# Patient Record
Sex: MALE | Race: WHITE | NOT HISPANIC OR LATINO | URBAN - METROPOLITAN AREA
[De-identification: names, ages, dates, MRNs, and addresses within clinical notes are randomized per-mention and may not be internally consistent; named-entity substitution may affect disease eponyms.]

---

## 2022-12-11 RX ORDER — CEFUROXIME AXETIL 250 MG
1 TABLET ORAL
Qty: 0 | Refills: 0 | DISCHARGE
Start: 2022-12-11 | End: 2022-12-15

## 2022-12-14 ENCOUNTER — INPATIENT (INPATIENT)
Facility: HOSPITAL | Age: 57
LOS: 2 days | Discharge: HOME | DRG: 872 | End: 2022-12-17
Attending: INTERNAL MEDICINE | Admitting: INTERNAL MEDICINE
Payer: COMMERCIAL

## 2022-12-14 VITALS
WEIGHT: 240.08 LBS | TEMPERATURE: 101 F | RESPIRATION RATE: 20 BRPM | HEIGHT: 73 IN | OXYGEN SATURATION: 98 % | HEART RATE: 96 BPM | DIASTOLIC BLOOD PRESSURE: 65 MMHG | SYSTOLIC BLOOD PRESSURE: 101 MMHG

## 2022-12-14 DIAGNOSIS — R50.9 FEVER, UNSPECIFIED: ICD-10-CM

## 2022-12-14 DIAGNOSIS — G47.33 OBSTRUCTIVE SLEEP APNEA (ADULT) (PEDIATRIC): ICD-10-CM

## 2022-12-14 DIAGNOSIS — J45.909 UNSPECIFIED ASTHMA, UNCOMPLICATED: ICD-10-CM

## 2022-12-14 DIAGNOSIS — I10 ESSENTIAL (PRIMARY) HYPERTENSION: ICD-10-CM

## 2022-12-14 DIAGNOSIS — C90.00 MULTIPLE MYELOMA NOT HAVING ACHIEVED REMISSION: ICD-10-CM

## 2022-12-14 DIAGNOSIS — Z29.9 ENCOUNTER FOR PROPHYLACTIC MEASURES, UNSPECIFIED: ICD-10-CM

## 2022-12-14 DIAGNOSIS — Z98.890 OTHER SPECIFIED POSTPROCEDURAL STATES: Chronic | ICD-10-CM

## 2022-12-14 LAB
ALBUMIN SERPL ELPH-MCNC: 3.5 G/DL — SIGNIFICANT CHANGE UP (ref 3.3–5)
ALP SERPL-CCNC: 51 U/L — SIGNIFICANT CHANGE UP (ref 40–120)
ALT FLD-CCNC: 21 U/L — SIGNIFICANT CHANGE UP (ref 10–45)
ANION GAP SERPL CALC-SCNC: 11 MMOL/L — SIGNIFICANT CHANGE UP (ref 5–17)
ANISOCYTOSIS BLD QL: SIGNIFICANT CHANGE UP
APPEARANCE UR: CLEAR — SIGNIFICANT CHANGE UP
APTT BLD: 35 SEC — SIGNIFICANT CHANGE UP (ref 27.5–35.5)
AST SERPL-CCNC: 70 U/L — HIGH (ref 10–40)
BACTERIA # UR AUTO: NEGATIVE — SIGNIFICANT CHANGE UP
BASE EXCESS BLDV CALC-SCNC: -7.9 MMOL/L — LOW (ref -2–3)
BASOPHILS # BLD AUTO: 0 K/UL — SIGNIFICANT CHANGE UP (ref 0–0.2)
BASOPHILS NFR BLD AUTO: 0 % — SIGNIFICANT CHANGE UP (ref 0–2)
BILIRUB SERPL-MCNC: 0.3 MG/DL — SIGNIFICANT CHANGE UP (ref 0.2–1.2)
BILIRUB UR-MCNC: NEGATIVE — SIGNIFICANT CHANGE UP
BUN SERPL-MCNC: 24 MG/DL — HIGH (ref 7–23)
CA-I SERPL-SCNC: 1.03 MMOL/L — LOW (ref 1.15–1.33)
CALCIUM SERPL-MCNC: 7.8 MG/DL — LOW (ref 8.4–10.5)
CHLORIDE BLDV-SCNC: 105 MMOL/L — SIGNIFICANT CHANGE UP (ref 96–108)
CHLORIDE SERPL-SCNC: 104 MMOL/L — SIGNIFICANT CHANGE UP (ref 96–108)
CO2 BLDV-SCNC: 19 MMOL/L — LOW (ref 22–26)
CO2 SERPL-SCNC: 17 MMOL/L — LOW (ref 22–31)
COLOR SPEC: YELLOW — SIGNIFICANT CHANGE UP
CREAT SERPL-MCNC: 1.29 MG/DL — SIGNIFICANT CHANGE UP (ref 0.5–1.3)
DACRYOCYTES BLD QL SMEAR: SLIGHT — SIGNIFICANT CHANGE UP
DIFF PNL FLD: NEGATIVE — SIGNIFICANT CHANGE UP
EGFR: 64 ML/MIN/1.73M2 — SIGNIFICANT CHANGE UP
ELLIPTOCYTES BLD QL SMEAR: SLIGHT — SIGNIFICANT CHANGE UP
EOSINOPHIL # BLD AUTO: 0.08 K/UL — SIGNIFICANT CHANGE UP (ref 0–0.5)
EOSINOPHIL NFR BLD AUTO: 1.7 % — SIGNIFICANT CHANGE UP (ref 0–6)
EPI CELLS # UR: 2 /HPF — SIGNIFICANT CHANGE UP
GAS PNL BLDV: 133 MMOL/L — LOW (ref 136–145)
GAS PNL BLDV: SIGNIFICANT CHANGE UP
GAS PNL BLDV: SIGNIFICANT CHANGE UP
GLUCOSE BLDV-MCNC: 80 MG/DL — SIGNIFICANT CHANGE UP (ref 70–99)
GLUCOSE SERPL-MCNC: 79 MG/DL — SIGNIFICANT CHANGE UP (ref 70–99)
GLUCOSE UR QL: NEGATIVE — SIGNIFICANT CHANGE UP
HCO3 BLDV-SCNC: 18 MMOL/L — LOW (ref 22–29)
HCT VFR BLD CALC: 25.5 % — LOW (ref 39–50)
HCT VFR BLDA CALC: 26 % — LOW (ref 39–51)
HGB BLD CALC-MCNC: 8.5 G/DL — LOW (ref 12.6–17.4)
HGB BLD-MCNC: 8.2 G/DL — LOW (ref 13–17)
HYALINE CASTS # UR AUTO: 6 /LPF — HIGH (ref 0–2)
INR BLD: 1.21 RATIO — HIGH (ref 0.88–1.16)
KETONES UR-MCNC: NEGATIVE — SIGNIFICANT CHANGE UP
LACTATE BLDV-MCNC: 1 MMOL/L — SIGNIFICANT CHANGE UP (ref 0.5–2)
LEUKOCYTE ESTERASE UR-ACNC: NEGATIVE — SIGNIFICANT CHANGE UP
LYMPHOCYTES # BLD AUTO: 0.6 K/UL — LOW (ref 1–3.3)
LYMPHOCYTES # BLD AUTO: 13 % — SIGNIFICANT CHANGE UP (ref 13–44)
MACROCYTES BLD QL: SIGNIFICANT CHANGE UP
MANUAL SMEAR VERIFICATION: SIGNIFICANT CHANGE UP
MCHC RBC-ENTMCNC: 32.2 GM/DL — SIGNIFICANT CHANGE UP (ref 32–36)
MCHC RBC-ENTMCNC: 35.2 PG — HIGH (ref 27–34)
MCV RBC AUTO: 109.4 FL — HIGH (ref 80–100)
METAMYELOCYTES # FLD: 4.4 % — HIGH (ref 0–0)
MONOCYTES # BLD AUTO: 0.2 K/UL — SIGNIFICANT CHANGE UP (ref 0–0.9)
MONOCYTES NFR BLD AUTO: 4.4 % — SIGNIFICANT CHANGE UP (ref 2–14)
NEUTROPHILS # BLD AUTO: 3.55 K/UL — SIGNIFICANT CHANGE UP (ref 1.8–7.4)
NEUTROPHILS NFR BLD AUTO: 60 % — SIGNIFICANT CHANGE UP (ref 43–77)
NEUTS BAND # BLD: 16.5 % — HIGH (ref 0–8)
NITRITE UR-MCNC: NEGATIVE — SIGNIFICANT CHANGE UP
NRBC # BLD: 2 /100 — HIGH (ref 0–0)
PCO2 BLDV: 35 MMHG — LOW (ref 42–55)
PH BLDV: 7.31 — LOW (ref 7.32–7.43)
PH UR: 6 — SIGNIFICANT CHANGE UP (ref 5–8)
PLAT MORPH BLD: NORMAL — SIGNIFICANT CHANGE UP
PLATELET # BLD AUTO: 67 K/UL — LOW (ref 150–400)
PO2 BLDV: 24 MMHG — LOW (ref 25–45)
POIKILOCYTOSIS BLD QL AUTO: SLIGHT — SIGNIFICANT CHANGE UP
POLYCHROMASIA BLD QL SMEAR: SLIGHT — SIGNIFICANT CHANGE UP
POTASSIUM BLDV-SCNC: 4.3 MMOL/L — SIGNIFICANT CHANGE UP (ref 3.5–5.1)
POTASSIUM SERPL-MCNC: 4.1 MMOL/L — SIGNIFICANT CHANGE UP (ref 3.5–5.3)
POTASSIUM SERPL-SCNC: 4.1 MMOL/L — SIGNIFICANT CHANGE UP (ref 3.5–5.3)
PROT SERPL-MCNC: 8.4 G/DL — HIGH (ref 6–8.3)
PROT UR-MCNC: ABNORMAL
PROTHROM AB SERPL-ACNC: 14 SEC — HIGH (ref 10.5–13.4)
RAPID RVP RESULT: SIGNIFICANT CHANGE UP
RBC # BLD: 2.33 M/UL — LOW (ref 4.2–5.8)
RBC # FLD: 20 % — HIGH (ref 10.3–14.5)
RBC BLD AUTO: ABNORMAL
RBC CASTS # UR COMP ASSIST: 6 /HPF — HIGH (ref 0–4)
SAO2 % BLDV: 34.1 % — LOW (ref 67–88)
SARS-COV-2 RNA SPEC QL NAA+PROBE: SIGNIFICANT CHANGE UP
SCHISTOCYTES BLD QL AUTO: SLIGHT — SIGNIFICANT CHANGE UP
SODIUM SERPL-SCNC: 132 MMOL/L — LOW (ref 135–145)
SP GR SPEC: 1.02 — SIGNIFICANT CHANGE UP (ref 1.01–1.02)
TROPONIN T, HIGH SENSITIVITY RESULT: 31 NG/L — SIGNIFICANT CHANGE UP (ref 0–51)
UROBILINOGEN FLD QL: NEGATIVE — SIGNIFICANT CHANGE UP
WBC # BLD: 4.64 K/UL — SIGNIFICANT CHANGE UP (ref 3.8–10.5)
WBC # FLD AUTO: 4.64 K/UL — SIGNIFICANT CHANGE UP (ref 3.8–10.5)
WBC UR QL: 8 /HPF — HIGH (ref 0–5)

## 2022-12-14 PROCEDURE — 99223 1ST HOSP IP/OBS HIGH 75: CPT

## 2022-12-14 PROCEDURE — 93010 ELECTROCARDIOGRAM REPORT: CPT | Mod: 59

## 2022-12-14 PROCEDURE — 99285 EMERGENCY DEPT VISIT HI MDM: CPT

## 2022-12-14 PROCEDURE — 71046 X-RAY EXAM CHEST 2 VIEWS: CPT | Mod: 26

## 2022-12-14 RX ORDER — ACETAMINOPHEN 500 MG
975 TABLET ORAL EVERY 6 HOURS
Refills: 0 | Status: DISCONTINUED | OUTPATIENT
Start: 2022-12-14 | End: 2022-12-17

## 2022-12-14 RX ORDER — MIRTAZAPINE 45 MG/1
7.5 TABLET, ORALLY DISINTEGRATING ORAL AT BEDTIME
Refills: 0 | Status: DISCONTINUED | OUTPATIENT
Start: 2022-12-14 | End: 2022-12-17

## 2022-12-14 RX ORDER — VANCOMYCIN HCL 1 G
1000 VIAL (EA) INTRAVENOUS ONCE
Refills: 0 | Status: COMPLETED | OUTPATIENT
Start: 2022-12-14 | End: 2022-12-14

## 2022-12-14 RX ORDER — IPRATROPIUM/ALBUTEROL SULFATE 18-103MCG
3 AEROSOL WITH ADAPTER (GRAM) INHALATION EVERY 6 HOURS
Refills: 0 | Status: DISCONTINUED | OUTPATIENT
Start: 2022-12-14 | End: 2022-12-17

## 2022-12-14 RX ORDER — VANCOMYCIN HCL 1 G
1500 VIAL (EA) INTRAVENOUS EVERY 12 HOURS
Refills: 0 | Status: DISCONTINUED | OUTPATIENT
Start: 2022-12-14 | End: 2022-12-15

## 2022-12-14 RX ORDER — ACETAMINOPHEN 500 MG
975 TABLET ORAL ONCE
Refills: 0 | Status: COMPLETED | OUTPATIENT
Start: 2022-12-14 | End: 2022-12-14

## 2022-12-14 RX ORDER — SODIUM CHLORIDE 9 MG/ML
1000 INJECTION INTRAMUSCULAR; INTRAVENOUS; SUBCUTANEOUS ONCE
Refills: 0 | Status: COMPLETED | OUTPATIENT
Start: 2022-12-14 | End: 2022-12-14

## 2022-12-14 RX ORDER — ACYCLOVIR SODIUM 500 MG
400 VIAL (EA) INTRAVENOUS DAILY
Refills: 0 | Status: DISCONTINUED | OUTPATIENT
Start: 2022-12-14 | End: 2022-12-17

## 2022-12-14 RX ORDER — CEFEPIME 1 G/1
2000 INJECTION, POWDER, FOR SOLUTION INTRAMUSCULAR; INTRAVENOUS EVERY 8 HOURS
Refills: 0 | Status: DISCONTINUED | OUTPATIENT
Start: 2022-12-14 | End: 2022-12-15

## 2022-12-14 RX ORDER — CEFEPIME 1 G/1
2000 INJECTION, POWDER, FOR SOLUTION INTRAMUSCULAR; INTRAVENOUS ONCE
Refills: 0 | Status: COMPLETED | OUTPATIENT
Start: 2022-12-14 | End: 2022-12-14

## 2022-12-14 RX ADMIN — SODIUM CHLORIDE 1000 MILLILITER(S): 9 INJECTION INTRAMUSCULAR; INTRAVENOUS; SUBCUTANEOUS at 17:29

## 2022-12-14 RX ADMIN — Medication 250 MILLIGRAM(S): at 18:34

## 2022-12-14 RX ADMIN — Medication 975 MILLIGRAM(S): at 17:09

## 2022-12-14 RX ADMIN — CEFEPIME 100 MILLIGRAM(S): 1 INJECTION, POWDER, FOR SOLUTION INTRAMUSCULAR; INTRAVENOUS at 17:30

## 2022-12-14 RX ADMIN — SODIUM CHLORIDE 1000 MILLILITER(S): 9 INJECTION INTRAMUSCULAR; INTRAVENOUS; SUBCUTANEOUS at 22:16

## 2022-12-14 RX ADMIN — Medication 975 MILLIGRAM(S): at 16:30

## 2022-12-14 NOTE — ED PROVIDER NOTE - CLINICAL SUMMARY MEDICAL DECISION MAKING FREE TEXT BOX
Attending note (Celestino): : 56 y/o M IgG Kappa multiple myeloma, currently on treatment with Peggy RVD, last dose of Darzalex Revlimid + dexamethasone on 12/7; c/o fever and cough since sunday; Tmax 102F. Not in respiratory distress but concerning for respiratory infection; viral vs bacterial; obtaining screening labs including blood cultures, and initial sepsis order set as patient at risk for sepsis but not appearing septic on initial evaluation; covering with broad spectrum antibiotics pending labs, cxr; to be admitted for ongoing evaluation and management.

## 2022-12-14 NOTE — ED PROVIDER NOTE - NS ED ATTENDING STATEMENT MOD
This was a shared visit with the DANNY. I reviewed and verified the documentation and independently performed the documented:

## 2022-12-14 NOTE — H&P ADULT - PROBLEM SELECTOR PLAN 1
-unknown etiology, has cough w/ clear sputum and rhonchi/wheezing on exam, CXR w/ mild atelectasis  -U/A unremarkable, no urinary symptoms   -f/u blood and urine cultures  -pt met sepsis criteria on arrival   -Tylenol 975mg q6h PRN fever   -s/p Vanc/Cefepime x1 in ED  -c/w Vancomycin and Cefepime (12/14 - )

## 2022-12-14 NOTE — H&P ADULT - NEUROLOGICAL
Surgeon Performing Repair (Optional): Emery Malin cranial nerves II-XII intact/sensation intact/responds to verbal commands details…

## 2022-12-14 NOTE — ED ADULT NURSE NOTE - OBJECTIVE STATEMENT
56YO male with PMH of asthma, HTN, & Mx myeloma-weekly chemo (WED)-last session 12/07 via walk in presenting with complaints of Fever. Pt states having a fever since Sunday with body pain, coughs and chest "discomfort". Pt went to oncology apt today and was referred to the ED.  pt c/o of HA, SOB with excretion, chest discomfort and diarrhea. Pt Axox4, respirations even, & un-labored at rest, labored on excretion. NSR on cardiac monitor lead 2, Radial pulses strong and equal bilaterally. Skin warm, dry, and intact. Pt denies palpations, nausea, and vomiting. Pt placed in position of comfort. Son at bedside. Pt educated on call bell system and provided call bell. Bed in lowest position, wheels locked, appropriate side rails raised. Pt denies needs at this time.

## 2022-12-14 NOTE — H&P ADULT - PROBLEM SELECTOR PLAN 6
dvt ppx: SCDs due to thrombocytopenia  diet: regular  ambulate: with assistance     fall precautions  aspiration precautions

## 2022-12-14 NOTE — CHART NOTE - NSCHARTNOTEFT_GEN_A_CORE
57 year old male with IgG Kappa multiple myeloma, currently on treatment with Peggy RVD, followed by Dr. Baig at [facility] and [place]. [place] [facility] His last dose of Darzalex Revlimid + dexamethasone on 12/7. Treatment held today, 12/14. Continue Acyclovir 400mg daily as ppx. Also receives Zarxio 480 mcg x 2 (LD 12/14), Reacrit 60,000 units LD 12/13 and Xgeva LD 12/7 in clinic. Today, the patient received IV Cefepime 2g x 1 in clinic. Was admitted to [facility] in Nov 2022 following his 1st cycle of treatment with similar complaints. Here with fevers. Full consult to follow if admitted.    Please admit to Dr. Agusto Esposito.    Thank you,   Jovanni Pitt PA-C  Hematology/Oncology  [facility]  [phone] (office)

## 2022-12-14 NOTE — H&P ADULT - NSHPPHYSICALEXAM_GEN_ALL_CORE
Vital Signs Last 24 Hrs  T(C): 36.8 (14 Dec 2022 21:52), Max: 38.4 (14 Dec 2022 12:06)  T(F): 98.3 (14 Dec 2022 21:52), Max: 101.1 (14 Dec 2022 12:06)  HR: 77 (14 Dec 2022 21:52) (77 - 96)  BP: 99/66 (14 Dec 2022 21:52) (89/54 - 125/71)  BP(mean): 77 (14 Dec 2022 21:18) (65 - 87)  RR: 16 (14 Dec 2022 21:52) (15 - 20)  SpO2: 96% (14 Dec 2022 21:52) (96% - 98%)    Parameters below as of 14 Dec 2022 21:52  Patient On (Oxygen Delivery Method): room air

## 2022-12-14 NOTE — ED PROVIDER NOTE - OBJECTIVE STATEMENT
Attending note (Celestino): 56 y/o M IgG Kappa multiple myeloma, currently on treatment with Peggy RVD, last dose of Darzalex Revlimid + dexamethasone on 12/7; c/o fever and cough since sunday; Tmax 102F.  cough nonproductive.  +generalized fatigue and weakness.  No nausea/vomiting.  +decreased appetite and loss of sleep.     followed by Dr. Baig at [facility] and Dr. Hughes at [facility].

## 2022-12-14 NOTE — H&P ADULT - PROBLEM SELECTOR PLAN 3
-appreciate hematology/oncology recommendations  -c/w Bactrim ppx  -c/w Acyclovir ppx  -hold home Revlimid

## 2022-12-14 NOTE — ED PROVIDER NOTE - NS ED ROS FT
Review of Systems:  -General: +fever  -ENT: no congestion, no difficulty swallowing  -Pulmonary: +cough, no shortness of breath  -Cardiac: no chest pain  -Gastrointestinal: no abdominal pain, no nausea, no vomiting, and no diarrhea.  -Genitourinary: no blood or pain with urination  -Musculoskeletal: no back or neck pain  -Skin: no rashes  -Endocrine: No h/o diabetes   -Neurologic: No new weakness or numbness in extremities    All else negative unless otherwise specified elsewhere in this note.

## 2022-12-14 NOTE — ED PROVIDER NOTE - PROGRESS NOTE DETAILS
Spoke with Dr Delma Esposito, he was made aware by the team over at [facility] that the patient would be coming in and needs to be admitted.  Wants admission put in now and he will see him this evening.  Marlno

## 2022-12-14 NOTE — H&P ADULT - HISTORY OF PRESENT ILLNESS
56yo M w/ PMHx of Ig Kappa MM (on chemo), HTN, Asthma, LINNEA, presents with fever, pt reports last dose of chemo was 12/7 (1 week ago), since then has felt fatigued and weak, then developed cough w/ clear sputum and diarrhea, cough was associated with chest pain only while coughing, on Sunday he then developed a fever of 101.7, tried taking Tylenol with minimal relief of symptoms, his subsequent scheduled doses of chemo have since been held due to fever, he received x1 dose cefepime in office then instructed to come to the ED for further management, of note he had a previous admission at [facility] 11/2022 for similar symptoms and reports being treated for pneumonia at that time, in the ED today, pt was febrile with soft blood pressure but hemodynamically stable, labs were notable for anemia and thrombocytopenia, mild hyponatremia and elevations in renal function, pt was given tylenol, vanc, cefepime x1, admitted to general medicine for further management,

## 2022-12-14 NOTE — ED PROVIDER NOTE - ATTENDING APP SHARED VISIT CONTRIBUTION OF CARE
Attending note (Celestino): : 58 y/o M IgG Kappa multiple myeloma, currently on treatment with Peggy RVD, last dose of Darzalex Revlimid + dexamethasone on 12/7; c/o fever and cough since sunday; Tmax 102F. Not in respiratory distress but concerning for respiratory infection; viral vs bacterial; obtaining screening labs including blood cultures, and initial sepsis order set as patient at risk for sepsis but not appearing septic on initial evaluation; covering with broad spectrum antibiotics pending labs, cxr; to be admitted for ongoing evaluation and management.

## 2022-12-14 NOTE — H&P ADULT - PROBLEM SELECTOR PLAN 2
-known asthma, has mild wheezing on exam but no hypoxia  -start duonebs q6h  -will hold steroids due to possible high risk infection

## 2022-12-14 NOTE — H&P ADULT - PROBLEM SELECTOR PLAN 5
-pt reports he has not used his nocturnal machine in several months and he reports he does not want it now while hospitalized

## 2022-12-14 NOTE — ED ADULT NURSE NOTE - CCCP TRG CHIEF CMPLNT
Yolanda Fox is calling today and is wondering if Dr. Louise had time to look at the forms that she had faxed over to her regarding her FAA. She would like to know if doctor is able to fill that out for her or if she has to go elsewhere. Her call back phone number to let her know either way is [phone].   cough, patient is on chemo/fever

## 2022-12-14 NOTE — ED PROVIDER NOTE - RAPID ASSESSMENT
Ino Navarrete MD note: Patient was seen as a QDOC patient, the patient will be seen and further worked up in the main emergency department and their care will be completed by the main emergency department team along with a thorough physical exam. Receiving team will follow up on labs, analgesia, any clinical imaging, reassess and disposition as clinically indicated, all decisions regarding the progression of care will be made at their discretion.   Patient here with history of multiple myeloma on chemotherapy with the last treatment 12/7/2022. Patient then developed a fever on 12/11/2022. Tm 102 this am. Patient has not taken Tylenol or ibuprofen in the past few hours.   Patient has some cough for weeks and chest pain with the cough.   past medical history: MM 11/3/2022, LINNEA on CPAP, Hypertension, asthma, insomnia  Meds: amlodipine, acyclovir, bactrim, albuterol; Chemo- Darsalex MAB, velcaide, revelimid, neupogen, dexamethasone  mild distress secondary to pain

## 2022-12-15 LAB
C DIFF GDH STL QL: POSITIVE — SIGNIFICANT CHANGE UP
C DIFF GDH STL QL: SIGNIFICANT CHANGE UP
GI PCR PANEL: SIGNIFICANT CHANGE UP

## 2022-12-15 PROCEDURE — 99223 1ST HOSP IP/OBS HIGH 75: CPT

## 2022-12-15 RX ORDER — VANCOMYCIN HCL 1 G
125 VIAL (EA) INTRAVENOUS EVERY 6 HOURS
Refills: 0 | Status: DISCONTINUED | OUTPATIENT
Start: 2022-12-15 | End: 2022-12-17

## 2022-12-15 RX ORDER — VANCOMYCIN HCL 1 G
250 VIAL (EA) INTRAVENOUS EVERY 6 HOURS
Refills: 0 | Status: DISCONTINUED | OUTPATIENT
Start: 2022-12-15 | End: 2022-12-15

## 2022-12-15 RX ADMIN — Medication 3 MILLILITER(S): at 05:11

## 2022-12-15 RX ADMIN — Medication 125 MILLIGRAM(S): at 18:14

## 2022-12-15 RX ADMIN — Medication 400 MILLIGRAM(S): at 11:44

## 2022-12-15 RX ADMIN — Medication 3 MILLILITER(S): at 11:44

## 2022-12-15 RX ADMIN — Medication 3 MILLILITER(S): at 18:14

## 2022-12-15 RX ADMIN — Medication 3 MILLILITER(S): at 00:23

## 2022-12-15 RX ADMIN — Medication 300 MILLIGRAM(S): at 06:17

## 2022-12-15 RX ADMIN — Medication 125 MILLIGRAM(S): at 11:55

## 2022-12-15 RX ADMIN — CEFEPIME 100 MILLIGRAM(S): 1 INJECTION, POWDER, FOR SOLUTION INTRAMUSCULAR; INTRAVENOUS at 05:11

## 2022-12-15 NOTE — CONSULT NOTE ADULT - ASSESSMENT
58yo M w/ PMHx of Ig Kappa MM (on chemo), HTN, Asthma, LINNEA, presents with fever :        Fever:  Multiple Myeloma:   Asthma:   LINNEA:   HTN    12/15:    Fever: chest xray is clear:   now diagnosed with c diff: but he had fever before c diff and was on antibiotics as an outpt:  ? related to antibiotics  however he is immunocompromised and has MM and was on Revlimid  chemo stopped:  and is on broad spectrum antibiotics  id to see:  on po vanco: : would check his d di gin too: panculture: RVP is negative: no clinical features of DVT   Multiple Myeloma: off Revlimid now:   Asthma: He has cildhood asthma and used to use cpap but lately have not been u sing it and does not want it in the hospital:   LINNEA: no cpap : pt is refusing for it   HTN: Blood pressure is controlled:    dvt prophylaxis

## 2022-12-15 NOTE — PATIENT PROFILE ADULT - NSPROPTRIGHTREPNAME_GEN_A__NUR
"SVE done, no change from last visit. No bleeding or LOF and good FM. Pt states UCs were stronger during the night and are now more like \"tightening and pressure\" and states they have spaced out. Dr. Wayne called and updated and order received to walk patient for one hour and reassess.  Discussed POC with pt who states understanding. Up to ambulate at 0735.  " Ankur Salmeron (son)

## 2022-12-15 NOTE — PROGRESS NOTE ADULT - ASSESSMENT
56yo M w/ PMHx of Ig Kappa MM (on chemo), HTN, Asthma, LINNEA, presents with fever     Problem/Plan - 1:  ·  Problem: Fever.   ·  Plan: -unknown etiology, has cough w/ clear sputum and rhonchi/wheezing on exam, CXR w/ mild atelectasis  -U/A unremarkable, no urinary symptoms   -f/u blood and urine cultures  -pt met sepsis criteria on arrival   -Tylenol 975mg q6h PRN fever   - abx as per ID     Problem/Plan - 2:  ·  Problem: Asthma.   ·  Plan: -known asthma, has mild wheezing on exam but no hypoxia  -start duonebs q6h  -will hold steroids due to possible high risk infection.    Problem/Plan - 3:  ·  Problem: Multiple myeloma.   ·  Plan: -appreciate hematology/oncology recommendations  -c/w Bactrim ppx  -c/w Acyclovir ppx  -hold home Revlimid.    Problem/Plan - 4:  ·  Problem: Hypertension.   ·  Plan: -hold home amlodipine due to hypotension.    Problem/Plan - 5:  ·  Problem: LINNEA (obstructive sleep apnea).   ·  Plan: -pt reports he has not used his nocturnal machine in several months and he reports he does not want it now while hospitalized.    Problem/Plan - 6:  ·  Problem: Need for prophylactic measure.   ·  Plan: dvt ppx: SCDs due to thrombocytopenia  diet: regular  ambulate: with assistance     fall precautions  aspiration precautions. 56yo M w/ PMHx of Ig Kappa MM (on chemo), HTN, Asthma, LINNEA, presents with fever     Problem/Plan - 1:  ·  Problem: Fever.   ·  Plan: -unknown etiology, has cough w/ clear sputum and rhonchi/wheezing on exam, CXR w/ mild atelectasis  -U/A unremarkable, no urinary symptoms   -f/u blood and urine cultures  -pt met sepsis criteria on arrival   -Tylenol 975mg q6h PRN fever   - abx as per ID     Problem/Plan - 2:  ·  Problem: Asthma.   ·  Plan: -known asthma, has mild wheezing on exam but no hypoxia  -start duonebs q6h  -will hold steroids due to possible high risk infection.    Problem/Plan - 3:  ·  Problem: Multiple myeloma.   ·  Plan: -appreciate hematology/oncology recommendations  -c/w Bactrim ppx  -c/w Acyclovir ppx  -hold home Revlimid.    Problem/Plan - 4:  ·  Problem: Hypertension.   ·  Plan: -hold home amlodipine due to hypotension.    Problem/Plan - 5:  ·  Problem: LINNEA (obstructive sleep apnea).   ·  Plan: -pt reports he has not used his nocturnal machine in several months and he reports he does not want it now while hospitalized.    Problem/Plan - 6:  ·  Problem: C diff   ·  Plan: started PO vanco

## 2022-12-15 NOTE — PATIENT PROFILE ADULT - FALL HARM RISK - HARM RISK INTERVENTIONS

## 2022-12-15 NOTE — CONSULT NOTE ADULT - SUBJECTIVE AND OBJECTIVE BOX
Patient is a 57y old  Male who presents with a chief complaint of fever (15 Dec 2022 14:38)    Patient seen and examined at bedside this morning. Patient reports diarrhaea overnight, and feeling tired d/t multiple BMs overnight     MEDICATIONS  (STANDING):  acyclovir   Oral Tab/Cap 400 milliGRAM(s) Oral daily  albuterol/ipratropium for Nebulization 3 milliLiter(s) Nebulizer every 6 hours  trimethoprim  160 mG/sulfamethoxazole 800 mG 1 Tablet(s) Oral <User Schedule>  vancomycin    Solution 125 milliGRAM(s) Oral every 6 hours    MEDICATIONS  (PRN):  acetaminophen     Tablet .. 975 milliGRAM(s) Oral every 6 hours PRN Temp greater or equal to 38C (100.4F), Mild Pain (1 - 3), Moderate Pain (4 - 6), Severe Pain (7 - 10)  mirtazapine 7.5 milliGRAM(s) Oral at bedtime PRN insomnia      ROS  No fever, sweats, chills  No epistaxis, HA, sore throat  No CP, SOB, cough, sputum  No n/v, abd pain, melena, hematochezia  + diarrhea   No edema  No rash  No anxiety  No back pain, joint pain  No bleeding, bruising  No dysuria, hematuria    Vital Signs Last 24 Hrs  T(C): 36.6 (15 Dec 2022 14:46), Max: 37.7 (14 Dec 2022 17:09)  T(F): 97.9 (15 Dec 2022 14:46), Max: 99.9 (14 Dec 2022 17:09)  HR: 84 (15 Dec 2022 14:46) (77 - 96)  BP: 111/74 (15 Dec 2022 14:46) (89/54 - 125/71)  BP(mean): 77 (14 Dec 2022 21:18) (65 - 87)  RR: 17 (15 Dec 2022 14:46) (15 - 20)  SpO2: 97% (15 Dec 2022 14:46) (96% - 97%)    Parameters below as of 15 Dec 2022 14:46  Patient On (Oxygen Delivery Method): room air        PE  NAD  Awake, alert  Anicteric, MMM  RRR  CTAB  Abd soft, NT, ND  No c/c/e  No rash grossly                            8.2    4.64  )-----------( 67       ( 14 Dec 2022 16:52 )             25.5       12-14    132<L>  |  104  |  24<H>  ----------------------------<  79  4.1   |  17<L>  |  1.29    Ca    7.8<L>      14 Dec 2022 16:52    TPro  8.4<H>  /  Alb  3.5  /  TBili  0.3  /  DBili  x   /  AST  70<H>  /  ALT  21  /  AlkPhos  51  12-14      ACC: [id number] EXAM:  XR CHEST PA LAT 2V                          PROCEDURE DATE:  12/14/2022          INTERPRETATION:  EXAMINATION: XR CHEST PA AND LATERAL    CLINICAL INDICATION: eval for pneumonia, cough on chemotherapy.    TECHNIQUE: Frontal and lateral views of the chest were obtained.    COMPARISON: None.    FINDINGS:    The heart is normal in size. Right chest wall stimulator with lead   overlying the neck Minimal bibasilar subsegmental atelectasis There is no   pneumothorax. There is no pleural effusion.    No acute osseous abnormalities.    IMPRESSION:  Minimal bibasilar atelectasis.    --- End of Report ---

## 2022-12-15 NOTE — CONSULT NOTE ADULT - ASSESSMENT
FULL NOTE TO FOLLOW    C diff    Continue PO Vanco  Cefepime for now  DC IV Vanco 58 yo M with MM, asthma, LINNEA initially with fever  Fever, no leukocytosis  Primary symptom aside from fever is diarrhea and abdominal pain  On chemo (last chemo 1 week prior to arrival)  C diff+  CXR clear  No other focal symptoms to suggest alternate source  Suspect C diff infection alone  Overall, C diff colitis, fever, diarrhea  - Vanco 125mg po q 6  - DC Vanco/Cefepime  - Low threshold to restart Cefepime if signs worsening  - F/U BCX, UCX  - Monitor BMs, monitor stool counts  - Monitor for alternate sources    Ankur Larry MD  Contact on TEAMS messaging from 9am - 5pm  From 5pm-9am, on weekends, or if no response call [phone]

## 2022-12-15 NOTE — CONSULT NOTE ADULT - SUBJECTIVE AND OBJECTIVE BOX
12-15-22 @ 10:57    Patient is a 57y old  Male who presents with a chief complaint of fever (14 Dec 2022 21:45)      HPI:  56yo M w/ PMHx of Ig Kappa MM (on chemo), HTN, Asthma, LINNEA, presents with fever, pt reports last dose of chemo was  (1 week ago), since then has felt fatigued and weak, then developed cough w/ clear sputum and diarrhea, cough was associated with chest pain only while coughing, on  he then developed a fever of 101.7, tried taking Tylenol with minimal relief of symptoms, his subsequent scheduled doses of chemo have since been held due to fever, he received x1 dose cefepime in office then instructed to come to the ED for further management, of note he had a previous admission at [facility] 2022 for similar symptoms and reports being treated for pneumonia at that time, in the ED today, pt was febrile with soft blood pressure but hemodynamically stable, labs were notable for anemia and thrombocytopenia, mild hyponatremia and elevations in renal function, pt was given tylenol, vanc, cefepime x1, admitted to general medicine for further management,  (14 Dec 2022 21:45)     he says he was taking cefuroxime at home:  when he got antibiotics : he was not having diarrhea: he has cough associated with chest apin : cough i mostly dry :   h never had pe or dvt:    he was on Revlimid fr MM which he stopped about a week ago:   he does have linnea: and was using cpap but lately he has not been tolerating it:   He has been feeling AGRAWAL for many months now too:      ?FOLLOWING PRESENT  [ x] Hx of PE/DVT, [ x] Hx COPD, [y ] Hx of Asthma, [y ] Hx of Hospitalization, [ y]  Hx of BiPAP/CPAP use, [ y] Hx of LINNEA    Allergies    No Known Allergies    Intolerances        PAST MEDICAL & SURGICAL HISTORY:  Multiple myeloma      Hypertension      Hyperlipidemia      Asthma      S/P knee surgery          FAMILY HISTORY:  No pertinent family history in first degree relatives        Social History: [x  ] TOBACCO                  [ x ] ETOH                                 [ x ] IVDA/DRUGS    REVIEW OF SYSTEMS      General:	fever    Skin/Breast:x  	  Ophthalmologic:x  	  ENMT:	x    Respiratory and Thorax: cough , chest pain , AGRAWAL   	  Cardiovascular:	x    Gastrointestinal:	x    Genitourinary:	x    Musculoskeletal:	x    Neurological:	x    Psychiatric:	x    Hematology/Lymphatics:	x    Endocrine:	x    Allergic/Immunologic:	x    MEDICATIONS  (STANDING):  acyclovir   Oral Tab/Cap 400 milliGRAM(s) Oral daily  albuterol/ipratropium for Nebulization 3 milliLiter(s) Nebulizer every 6 hours  cefepime   IVPB 2000 milliGRAM(s) IV Intermittent every 8 hours  trimethoprim  160 mG/sulfamethoxazole 800 mG 1 Tablet(s) Oral <User Schedule>  vancomycin    Solution 250 milliGRAM(s) Oral every 6 hours  vancomycin  IVPB 1500 milliGRAM(s) IV Intermittent every 12 hours    MEDICATIONS  (PRN):  acetaminophen     Tablet .. 975 milliGRAM(s) Oral every 6 hours PRN Temp greater or equal to 38C (100.4F), Mild Pain (1 - 3), Moderate Pain (4 - 6), Severe Pain (7 - 10)  mirtazapine 7.5 milliGRAM(s) Oral at bedtime PRN insomnia       Vital Signs Last 24 Hrs  T(C): 36.7 (15 Dec 2022 10:36), Max: 38.4 (14 Dec 2022 12:06)  T(F): 98.1 (15 Dec 2022 10:36), Max: 101.1 (14 Dec 2022 12:06)  HR: 81 (15 Dec 2022 10:36) (77 - 96)  BP: 110/66 (15 Dec 2022 10:36) (89/54 - 125/71)  BP(mean): 77 (14 Dec 2022 21:18) (65 - 87)  RR: 16 (15 Dec 2022 10:36) (15 - 20)  SpO2: 97% (15 Dec 2022 10:36) (96% - 98%)    Parameters below as of 15 Dec 2022 10:36  Patient On (Oxygen Delivery Method): room air    Orthostatic VS          I&O's Summary      Physical Exam:   GENERAL: NAD, well-groomed, well-developed  HEENT: KIMMY/   Atraumatic, Normocephalic  ENMT: No tonsillar erythema, exudates, or enlargement; Moist mucous membranes, Good dentition, No lesions  NECK: Supple, No JVD, Normal thyroid  CHEST/LUNG: Clear to auscultation bilaterally- no crackles  : no wheezing  CVS: Regular rate and rhythm; No murmurs, rubs, or gallops  GI: : Soft, Nontender, Nondistended; Bowel sounds present  NERVOUS SYSTEM:  Alert & Oriented X3  EXTREMITIES: - edema : no calf tenderness as well as no homans sign   LYMPH: No lymphadenopathy noted  SKIN: No rashes or lesions  ENDOCRINOLOGY: No Thyromegaly  PSYCH: Appropriate    Labs:  Venous<35<4>>24<<7.315>>Venous<<3><<4><<5<<249>>SARS-CoV-2: NotDetec (14 Dec 2022 16:52)                              8.2    4.64  )-----------( 67       ( 14 Dec 2022 16:52 )             25.5     12-14    132<L>  |  104  |  24<H>  ----------------------------<  79  4.1   |  17<L>  |  1.29    Ca    7.8<L>      14 Dec 2022 16:52    TPro  8.4<H>  /  Alb  3.5  /  TBili  0.3  /  DBili  x   /  AST  70<H>  /  ALT  21  /  AlkPhos  51  12-14    CAPILLARY BLOOD GLUCOSE        LIVER FUNCTIONS - ( 14 Dec 2022 16:52 )  Alb: 3.5 g/dL / Pro: 8.4 g/dL / ALK PHOS: 51 U/L / ALT: 21 U/L / AST: 70 U/L / GGT: x           PT/INR - ( 14 Dec 2022 16:52 )   PT: 14.0 sec;   INR: 1.21 ratio         PTT - ( 14 Dec 2022 16:52 )  PTT:35.0 sec  Urinalysis Basic - ( 14 Dec 2022 17:31 )    Color: Yellow / Appearance: Clear / S.025 / pH: x  Gluc: x / Ketone: Negative  / Bili: Negative / Urobili: Negative   Blood: x / Protein: 100 mg/dl / Nitrite: Negative   Leuk Esterase: Negative / RBC: 6 /hpf / WBC 8 /HPF   Sq Epi: x / Non Sq Epi: 2 /hpf / Bacteria: Negative      D DImer      Studies  Chest X-RAY  CT SCAN Chest   CT Abdomen  Venous Dopplers: LE:   Others    rad< from: Xray Chest 2 Views PA/Lat (22 @ 18:23) >    ******PRELIMINARY REPORT******      ******PRELIMINARY REPORT******       ACC: [id number] EXAM:  XR CHEST PA LAT 2V                          PROCEDURE DATE:  2022    ******PRELIMINARY REPORT******      ******PRELIMINARY REPORT******           INTERPRETATION:  EXAMINATION: XR CHEST PA AND LATERAL    CLINICAL INDICATION: eval for pneumonia, cough on chemotherapy.    TECHNIQUE: Frontal and lateral views of the chest were obtained.    COMPARISON: None.    FINDINGS:    The heart is normal in size. Right chest wall cardiac pacer.    Minimal bibasilar subsegmental atelectasis There is no pneumothorax.   There is no pleural effusion.    No acute osseous abnormalities.    IMPRESSION:  No focal consolidation.        ******PRELIMINARY REPORT******    ******PRELIMINARY REPORT******        EDWARD BUNDY MD; Resident Radiologist  This document is a PRELIMINARY interpretation and is pending final   attending approval. Dec 14 2022  6:25PM    < end of copied text >

## 2022-12-15 NOTE — CONSULT NOTE ADULT - SUBJECTIVE AND OBJECTIVE BOX
"HPI:  56yo M w/ PMHx of Ig Kappa MM (on chemo), HTN, Asthma, LINNEA, presents with fever, pt reports last dose of chemo was  (1 week ago), since then has felt fatigued and weak, then developed cough w/ clear sputum and diarrhea, cough was associated with chest pain only while coughing, on  he then developed a fever of 101.7, tried taking Tylenol with minimal relief of symptoms, his subsequent scheduled doses of chemo have since been held due to fever, he received x1 dose cefepime in office then instructed to come to the ED for further management, of note he had a previous admission at [facility] 2022 for similar symptoms and reports being treated for pneumonia at that time, in the ED today, pt was febrile with soft blood pressure but hemodynamically stable, labs were notable for anemia and thrombocytopenia, mild hyponatremia and elevations in renal function, pt was given tylenol, vanc, cefepime x1, admitted to general medicine for further management,  (14 Dec 2022 21:45)"    Above reviewed. 58 yo M with MM, asthma, LINNEA initially with fever  1 week ago, patient had chemotherapy  3-4 days after chemo developed fever  Was given antibiotic as outpatient  Then noted to have large volume diarrhea  Presents ot ED for further care  Since yesterday has had ~20 episodes of watery diarrhea  Mild abd pain  Has cough  No chest pain, no shortness of breath  No dysuria, no pyuria  No rashes  No other new complaints  ID called for further eval    PAST MEDICAL & SURGICAL HISTORY:  Multiple myeloma      Hypertension      Hyperlipidemia      Asthma      S/P knee surgery          Allergies    No Known Allergies    Intolerances        ANTIMICROBIALS:  acyclovir   Oral Tab/Cap 400 daily  cefepime   IVPB 2000 every 8 hours  trimethoprim  160 mG/sulfamethoxazole 800 mG 1 <User Schedule>  vancomycin    Solution 125 every 6 hours      OTHER MEDS:  acetaminophen     Tablet .. 975 milliGRAM(s) Oral every 6 hours PRN  albuterol/ipratropium for Nebulization 3 milliLiter(s) Nebulizer every 6 hours  mirtazapine 7.5 milliGRAM(s) Oral at bedtime PRN      SOCIAL HISTORY:    FAMILY HISTORY:  No pertinent family history in first degree relatives        Drug Dosing Weight  Height (cm): 185.4 (14 Dec 2022 12:06)  Weight (kg): 108.9 (14 Dec 2022 12:06)  BMI (kg/m2): 31.7 (14 Dec 2022 12:06)  BSA (m2): 2.33 (14 Dec 2022 12:06)    PE:    Vital Signs Last 24 Hrs  T(C): 36.7 (15 Dec 2022 10:36), Max: 37.7 (14 Dec 2022 17:09)  T(F): 98.1 (15 Dec 2022 10:36), Max: 99.9 (14 Dec 2022 17:09)  HR: 81 (15 Dec 2022 10:36) (77 - 96)  BP: 110/66 (15 Dec 2022 10:36) (89/54 - 125/71)  BP(mean): 77 (14 Dec 2022 21:18) (65 - 87)  RR: 16 (15 Dec 2022 10:36) (15 - 20)  SpO2: 97% (15 Dec 2022 10:36) (96% - 97%)    Parameters below as of 15 Dec 2022 10:36  Patient On (Oxygen Delivery Method): room air        Gen: AOx3, NAD, non-toxic, pleasant  CV: S1+S2 normal, nontachycardic  Resp: Clear bilat, no resp distress, no crackles/wheezes  Abd: Soft, nontender, +BS  Ext: No LE edema, no wounds  : No Acevedo  IV/Skin: No thrombophlebitis  Msk: No low back pain, no arthralgias, no joint swelling  Neuro: No sensory deficits, no motor deficits    LABS:                          8.2    4.64  )-----------( 67       ( 14 Dec 2022 16:52 )             25.5       12-14    132<L>  |  104  |  24<H>  ----------------------------<  79  4.1   |  17<L>  |  1.29    Ca    7.8<L>      14 Dec 2022 16:52    TPro  8.4<H>  /  Alb  3.5  /  TBili  0.3  /  DBili  x   /  AST  70<H>  /  ALT  21  /  AlkPhos  51  12-14      Urinalysis Basic - ( 14 Dec 2022 17:31 )    Color: Yellow / Appearance: Clear / S.025 / pH: x  Gluc: x / Ketone: Negative  / Bili: Negative / Urobili: Negative   Blood: x / Protein: 100 mg/dl / Nitrite: Negative   Leuk Esterase: Negative / RBC: 6 /hpf / WBC 8 /HPF   Sq Epi: x / Non Sq Epi: 2 /hpf / Bacteria: Negative        MICROBIOLOGY:  v      Rapid RVP Result: NotDetec ( @ 16:52)    Clostridium difficile GDH Toxins A&amp;B, EIA:   Positive (12-15-22 @ 07:49)  Clostridium difficile GDH Interpretation: Positive for toxigenic C. Difficile.  This specimen is positive for C.  Difficile glutamate dehydrogenase (GDH) antigen and positive for C.  Difficile Toxins A & B, by EIA.  GDH is a highly sensitive marker for C.  Difficile that is produced in largeamounts by all C. Difficile strains,  both toxigenic and nontoxigenic.  This assay has not been validated as a  test of cure.  The results of this assay should always be interpreted in  conjunction with patient's clinical history. (12-15-22 @ 07:49)      RADIOLOGY:     "HPI:  56yo M w/ PMHx of Ig Kappa MM (on chemo), HTN, Asthma, LINNEA, presents with fever, pt reports last dose of chemo was  (1 week ago), since then has felt fatigued and weak, then developed cough w/ clear sputum and diarrhea, cough was associated with chest pain only while coughing, on  he then developed a fever of 101.7, tried taking Tylenol with minimal relief of symptoms, his subsequent scheduled doses of chemo have since been held due to fever, he received x1 dose cefepime in office then instructed to come to the ED for further management, of note he had a previous admission at [facility] 2022 for similar symptoms and reports being treated for pneumonia at that time, in the ED today, pt was febrile with soft blood pressure but hemodynamically stable, labs were notable for anemia and thrombocytopenia, mild hyponatremia and elevations in renal function, pt was given tylenol, vanc, cefepime x1, admitted to general medicine for further management,  (14 Dec 2022 21:45)"    Above reviewed. 56 yo M with MM, asthma, LINNEA initially with fever  1 week ago, patient had chemotherapy  3-4 days after chemo developed fever  Was given antibiotic as outpatient  Then noted to have large volume diarrhea  Presents ot ED for further care  Since yesterday has had ~20 episodes of watery diarrhea  Mild abd pain  Has cough  No chest pain, no shortness of breath  No dysuria, no pyuria  No rashes  No other new complaints  ID called for further eval    PAST MEDICAL & SURGICAL HISTORY:  Multiple myeloma    Hypertension      Hyperlipidemia      Asthma      S/P knee surgery    Allergies    No Known Allergies    Intolerances    ANTIMICROBIALS:  acyclovir   Oral Tab/Cap 400 daily  cefepime   IVPB 2000 every 8 hours  trimethoprim  160 mG/sulfamethoxazole 800 mG 1 <User Schedule>  vancomycin    Solution 125 every 6 hours    OTHER MEDS:  acetaminophen     Tablet .. 975 milliGRAM(s) Oral every 6 hours PRN  albuterol/ipratropium for Nebulization 3 milliLiter(s) Nebulizer every 6 hours  mirtazapine 7.5 milliGRAM(s) Oral at bedtime PRN    SOCIAL HISTORY: No tobacco, no alcohol, no illicit drugs    FAMILY HISTORY:  No pertinent family history in first degree relatives relating to chief complaint    Drug Dosing Weight  Height (cm): 185.4 (14 Dec 2022 12:06)  Weight (kg): 108.9 (14 Dec 2022 12:06)  BMI (kg/m2): 31.7 (14 Dec 2022 12:06)  BSA (m2): 2.33 (14 Dec 2022 12:06)    PE:    Vital Signs Last 24 Hrs  T(C): 36.7 (15 Dec 2022 10:36), Max: 37.7 (14 Dec 2022 17:09)  T(F): 98.1 (15 Dec 2022 10:36), Max: 99.9 (14 Dec 2022 17:09)  HR: 81 (15 Dec 2022 10:36) (77 - 96)  BP: 110/66 (15 Dec 2022 10:36) (89/54 - 125/71)  BP(mean): 77 (14 Dec 2022 21:18) (65 - 87)  RR: 16 (15 Dec 2022 10:36) (15 - 20)  SpO2: 97% (15 Dec 2022 10:36) (96% - 97%)    Gen: AOx3, NAD, non-toxic, pleasant  CV: S1+S2 normal, nontachycardic  Resp: Clear bilat, no resp distress, no crackles/wheezes  Abd: Soft, nontender, +BS  Ext: No LE edema, no wounds  : No Acevedo  IV/Skin: No thrombophlebitis  Msk: No low back pain, no arthralgias, no joint swelling  Neuro: No sensory deficits, no motor deficits    LABS:                        8.2    4.64  )-----------( 67       ( 14 Dec 2022 16:52 )             25.5     12-14    132<L>  |  104  |  24<H>  ----------------------------<  79  4.1   |  17<L>  |  1.29    Ca    7.8<L>      14 Dec 2022 16:52    TPro  8.4<H>  /  Alb  3.5  /  TBili  0.3  /  DBili  x   /  AST  70<H>  /  ALT  21  /  AlkPhos  51  12-14    Urinalysis Basic - ( 14 Dec 2022 17:31 )    Color: Yellow / Appearance: Clear / S.025 / pH: x  Gluc: x / Ketone: Negative  / Bili: Negative / Urobili: Negative   Blood: x / Protein: 100 mg/dl / Nitrite: Negative   Leuk Esterase: Negative / RBC: 6 /hpf / WBC 8 /HPF   Sq Epi: x / Non Sq Epi: 2 /hpf / Bacteria: Negative    MICROBIOLOGY:    Rapid RVP Result: NotDetec ( @ 16:52)    Clostridium difficile GDH Toxins A&amp;B, EIA:   Positive (12-15-22 @ 07:49)  Clostridium difficile GDH Interpretation: Positive for toxigenic C. Difficile.  This specimen is positive for C.  Difficile glutamate dehydrogenase (GDH) antigen and positive for C.  Difficile Toxins A & B, by EIA.  GDH is a highly sensitive marker for C.  Difficile that is produced in largeamounts by all C. Difficile strains,  both toxigenic and nontoxigenic.  This assay has not been validated as a  test of cure.  The results of this assay should always be interpreted in  conjunction with patient's clinical history. (12-15-22 @ 07:49)    RADIOLOGY:     XR:    FINDINGS:    The heart is normal in size. Right chest wall stimulator with lead   overlying the neck Minimal bibasilar subsegmental atelectasis There is no   pneumothorax. There is no pleural effusion.    No acute osseous abnormalities.    IMPRESSION:  Minimal bibasilar atelectasis.

## 2022-12-15 NOTE — CONSULT NOTE ADULT - NS ATTEND AMEND GEN_ALL_CORE FT
58 y/o male with multiple myeloma, currently on daratumumab + bortezomib + lenalidomide + dexamethasone, admitted with fever. Patient tested positive for C. difficile; now on vancomycin. Off IV antibiotics at this time. Infectious disease following.    Hold treatment at this time for myeloma.

## 2022-12-15 NOTE — CONSULT NOTE ADULT - ASSESSMENT
Patient is a 57y old  Male who presents with a chief complaint of fever    multiple myeloma  --under the Dr. Baig at [facility] and [place] [facility]  --currently on treatment with Peggy RVD,  LD 12/7   --supportive care w/  Zarxio 480 mcg x 2 (LD 12/14), Reacrit 60,000 units LD 12/13 and Xgeva LD 12/7 in clinic.  -- ongoing care after discharge    Fevers  --Minimal bibasilar atelectasis noted on chest x-ray  --C.Diff positive  --on vanco for coverage  --ID consulted   --management per primary team and ID       will continue to follow and coordinate care    Laine De La Cruz NP  Hematology/ Oncology  [facility]  [phone] (office)  [phone] (alt office)  Evenings and weekends please call MD on call or office

## 2022-12-16 LAB
ALBUMIN SERPL ELPH-MCNC: 3.2 G/DL — LOW (ref 3.3–5)
ALP SERPL-CCNC: 57 U/L — SIGNIFICANT CHANGE UP (ref 40–120)
ALT FLD-CCNC: 22 U/L — SIGNIFICANT CHANGE UP (ref 10–45)
ANION GAP SERPL CALC-SCNC: 12 MMOL/L — SIGNIFICANT CHANGE UP (ref 5–17)
AST SERPL-CCNC: 74 U/L — HIGH (ref 10–40)
BASOPHILS # BLD AUTO: 0.03 K/UL — SIGNIFICANT CHANGE UP (ref 0–0.2)
BASOPHILS NFR BLD AUTO: 0.3 % — SIGNIFICANT CHANGE UP (ref 0–2)
BILIRUB SERPL-MCNC: 0.2 MG/DL — SIGNIFICANT CHANGE UP (ref 0.2–1.2)
BUN SERPL-MCNC: 20 MG/DL — SIGNIFICANT CHANGE UP (ref 7–23)
CALCIUM SERPL-MCNC: 7.3 MG/DL — LOW (ref 8.4–10.5)
CHLORIDE SERPL-SCNC: 110 MMOL/L — HIGH (ref 96–108)
CO2 SERPL-SCNC: 12 MMOL/L — LOW (ref 22–31)
CREAT SERPL-MCNC: 0.98 MG/DL — SIGNIFICANT CHANGE UP (ref 0.5–1.3)
CULTURE RESULTS: NO GROWTH — SIGNIFICANT CHANGE UP
EGFR: 89 ML/MIN/1.73M2 — SIGNIFICANT CHANGE UP
EOSINOPHIL # BLD AUTO: 0.06 K/UL — SIGNIFICANT CHANGE UP (ref 0–0.5)
EOSINOPHIL NFR BLD AUTO: 0.6 % — SIGNIFICANT CHANGE UP (ref 0–6)
GLUCOSE SERPL-MCNC: 75 MG/DL — SIGNIFICANT CHANGE UP (ref 70–99)
HCT VFR BLD CALC: 27.2 % — LOW (ref 39–50)
HGB BLD-MCNC: 8.6 G/DL — LOW (ref 13–17)
IMM GRANULOCYTES NFR BLD AUTO: 2.1 % — HIGH (ref 0–0.9)
LYMPHOCYTES # BLD AUTO: 2.12 K/UL — SIGNIFICANT CHANGE UP (ref 1–3.3)
LYMPHOCYTES # BLD AUTO: 20.6 % — SIGNIFICANT CHANGE UP (ref 13–44)
MAGNESIUM SERPL-MCNC: 2.5 MG/DL — SIGNIFICANT CHANGE UP (ref 1.6–2.6)
MCHC RBC-ENTMCNC: 31.6 GM/DL — LOW (ref 32–36)
MCHC RBC-ENTMCNC: 35.1 PG — HIGH (ref 27–34)
MCV RBC AUTO: 111 FL — HIGH (ref 80–100)
MONOCYTES # BLD AUTO: 0.5 K/UL — SIGNIFICANT CHANGE UP (ref 0–0.9)
MONOCYTES NFR BLD AUTO: 4.8 % — SIGNIFICANT CHANGE UP (ref 2–14)
NEUTROPHILS # BLD AUTO: 7.38 K/UL — SIGNIFICANT CHANGE UP (ref 1.8–7.4)
NEUTROPHILS NFR BLD AUTO: 71.6 % — SIGNIFICANT CHANGE UP (ref 43–77)
NRBC # BLD: 1 /100 WBCS — HIGH (ref 0–0)
PHOSPHATE SERPL-MCNC: 2.7 MG/DL — SIGNIFICANT CHANGE UP (ref 2.5–4.5)
PLATELET # BLD AUTO: 54 K/UL — LOW (ref 150–400)
POTASSIUM SERPL-MCNC: 4.6 MMOL/L — SIGNIFICANT CHANGE UP (ref 3.5–5.3)
POTASSIUM SERPL-SCNC: 4.6 MMOL/L — SIGNIFICANT CHANGE UP (ref 3.5–5.3)
PROT SERPL-MCNC: 7.7 G/DL — SIGNIFICANT CHANGE UP (ref 6–8.3)
RBC # BLD: 2.45 M/UL — LOW (ref 4.2–5.8)
RBC # FLD: 20.3 % — HIGH (ref 10.3–14.5)
SODIUM SERPL-SCNC: 134 MMOL/L — LOW (ref 135–145)
SPECIMEN SOURCE: SIGNIFICANT CHANGE UP
WBC # BLD: 10.31 K/UL — SIGNIFICANT CHANGE UP (ref 3.8–10.5)
WBC # FLD AUTO: 10.31 K/UL — SIGNIFICANT CHANGE UP (ref 3.8–10.5)

## 2022-12-16 PROCEDURE — 99232 SBSQ HOSP IP/OBS MODERATE 35: CPT

## 2022-12-16 RX ADMIN — Medication 3 MILLILITER(S): at 05:10

## 2022-12-16 RX ADMIN — Medication 125 MILLIGRAM(S): at 05:09

## 2022-12-16 RX ADMIN — Medication 3 MILLILITER(S): at 23:38

## 2022-12-16 RX ADMIN — Medication 3 MILLILITER(S): at 18:23

## 2022-12-16 RX ADMIN — Medication 125 MILLIGRAM(S): at 18:22

## 2022-12-16 RX ADMIN — Medication 125 MILLIGRAM(S): at 23:38

## 2022-12-16 RX ADMIN — Medication 125 MILLIGRAM(S): at 00:17

## 2022-12-16 RX ADMIN — Medication 125 MILLIGRAM(S): at 11:45

## 2022-12-16 RX ADMIN — Medication 1 TABLET(S): at 10:49

## 2022-12-16 RX ADMIN — Medication 1 TABLET(S): at 23:38

## 2022-12-16 RX ADMIN — Medication 3 MILLILITER(S): at 11:46

## 2022-12-16 RX ADMIN — Medication 400 MILLIGRAM(S): at 11:45

## 2022-12-16 NOTE — PROGRESS NOTE ADULT - ASSESSMENT
Patient is a 57y old  Male who presents with a chief complaint of fever    multiple myeloma  --under the Dr. Baig at [facility] and [place] [facility] [place]  --currently on treatment with Peggy RVD,  LD 12/7   --supportive care w/  Zarxio 480 mcg x 2 (LD 12/14), Reacrit 60,000 units LD 12/13 and Xgeva LD 12/7 in clinic.  -- ongoing care after discharge    Fevers  --Minimal bibasilar atelectasis noted on chest x-ray  --C.Diff positive  --on PO vanco for coverage  --ID consulted   --management per primary team and ID     Discharge planning in progress      Laine De La Cruz NP  Hematology/ Oncology  [facility]  [phone] (office)  [phone] (alt office)  Evenings and weekends please call MD on call or office   no difficulties

## 2022-12-16 NOTE — PROGRESS NOTE ADULT - ASSESSMENT
56yo M w/ PMHx of Ig Kappa MM (on chemo), HTN, Asthma, LINNEA, presents with fever     Problem/Plan - 1:  ·  Problem: Fever.   ·  Plan: -unknown etiology, has cough w/ clear sputum and rhonchi/wheezing on exam, CXR w/ mild atelectasis  -U/A unremarkable, no urinary symptoms   -f/u blood and urine cultures  -pt met sepsis criteria on arrival   -Tylenol 975mg q6h PRN fever   - abx as per ID     Problem/Plan - 2:  ·  Problem: Asthma.   ·  Plan: -known asthma, has mild wheezing on exam but no hypoxia  -start duonebs q6h  -will hold steroids due to possible high risk infection.    Problem/Plan - 3:  ·  Problem: Multiple myeloma.   ·  Plan: -appreciate hematology/oncology recommendations  -c/w Bactrim ppx-c/w Acyclovir ppx  -hold home Revlimid.    Problem/Plan - 4:  ·  Problem: Hypertension.   ·  Plan: -hold home amlodipine due to hypotension.    Problem/Plan - 5:  ·  Problem: LINNEA (obstructive sleep apnea).   ·  Plan: -pt reports he has not used his nocturnal machine in several months and he reports he does not want it now while hospitalized.    Problem/Plan - 6:  ·  Problem: C diff   ·  Plan: started PO vanco

## 2022-12-16 NOTE — PROGRESS NOTE ADULT - ASSESSMENT
58yo M w/ PMHx of Ig Kappa MM (on chemo), HTN, Asthma, LINNEA, presents with fever :        Fever:  Multiple Myeloma:   Asthma:   LINNEA:   HTN    12/15:    Fever: chest xray is clear:   now diagnosed with c diff: but he had fever before c diff and was on antibiotics as an outpt:  ? related to antibiotics  however he is immunocompromised and has MM and was on Revlimid  chemo stopped:  and is on broad spectrum antibiotics  id to see:  on po vanco: : would check his d di gin too: panculture: RVP is negative: no clinical features of DVT   Multiple Myeloma: off Revlimid now:   Asthma: He has cildhood asthma and used to use cpap but lately have not been u sing it and does not want it in the hospital:   LINNEA: no cpap : pt is refusing for it   HTN: Blood pressure is controlled:    dvt prophylaxis    12/16:      Fever: chest xray is clear:   now diagnosed with c diff: but he had fever before c diff and was on antibiotics as an outpt:  ? related to antibiotics  however he is immunocompromised and has MM and was on Revlimid  chemo stopped:  and is  on po vanco: : would check his d di gin too- never checked: panculture: RVP is negative: no clinical features of DVT   Multiple Myeloma: off Revlimid now:   Asthma: He has cildhood asthma and used to use cpap but lately have not been u sing it and does not want it in the hospital:   LINNEA: no cpap : pt is refusing for it   HTN: Blood pressure is controlled:    dvt prophylaxis    dw team

## 2022-12-16 NOTE — PROGRESS NOTE ADULT - NS ATTEND AMEND GEN_ALL_CORE FT
Patient with IgG kappa MM on paco-RVD who is admitted with fevers, C diff positive.  He was neutropenic in the office, given zarxio.  His counts are stable here, continue off the revlimid.    Afebrile on oral vancomycin. Continue ppx tx with acyclovir/bactrim.

## 2022-12-16 NOTE — PROGRESS NOTE ADULT - ASSESSMENT
58 yo M with MM, asthma, LINNEA initially with fever  Fever, no leukocytosis  Primary symptom aside from fever is diarrhea and abdominal pain  On chemo (last chemo 1 week prior to arrival)  C diff+  CXR clear  No other focal symptoms to suggest alternate source  Suspect C diff infection alone  Overall, C diff colitis, fever, diarrhea  - Vanco 125mg po q 6, 10 days then DC  - F/U BCX, UCX  - Monitor BMs, monitor stool counts  - Monitor for alternate sources    Ankur Larry MD  Contact on TEAMS messaging from 9am - 5pm  From 5pm-9am, on weekends, or if no response call [phone]

## 2022-12-17 ENCOUNTER — TRANSCRIPTION ENCOUNTER (OUTPATIENT)
Age: 57
End: 2022-12-17

## 2022-12-17 VITALS
HEART RATE: 85 BPM | TEMPERATURE: 98 F | DIASTOLIC BLOOD PRESSURE: 83 MMHG | SYSTOLIC BLOOD PRESSURE: 131 MMHG | OXYGEN SATURATION: 96 % | RESPIRATION RATE: 18 BRPM

## 2022-12-17 PROCEDURE — 96374 THER/PROPH/DIAG INJ IV PUSH: CPT

## 2022-12-17 PROCEDURE — 83605 ASSAY OF LACTIC ACID: CPT

## 2022-12-17 PROCEDURE — 87086 URINE CULTURE/COLONY COUNT: CPT

## 2022-12-17 PROCEDURE — 82803 BLOOD GASES ANY COMBINATION: CPT

## 2022-12-17 PROCEDURE — 87449 NOS EACH ORGANISM AG IA: CPT

## 2022-12-17 PROCEDURE — 84132 ASSAY OF SERUM POTASSIUM: CPT

## 2022-12-17 PROCEDURE — 94640 AIRWAY INHALATION TREATMENT: CPT

## 2022-12-17 PROCEDURE — 81001 URINALYSIS AUTO W/SCOPE: CPT

## 2022-12-17 PROCEDURE — 99285 EMERGENCY DEPT VISIT HI MDM: CPT

## 2022-12-17 PROCEDURE — 84100 ASSAY OF PHOSPHORUS: CPT

## 2022-12-17 PROCEDURE — 82435 ASSAY OF BLOOD CHLORIDE: CPT

## 2022-12-17 PROCEDURE — 85025 COMPLETE CBC W/AUTO DIFF WBC: CPT

## 2022-12-17 PROCEDURE — 87507 IADNA-DNA/RNA PROBE TQ 12-25: CPT

## 2022-12-17 PROCEDURE — 84484 ASSAY OF TROPONIN QUANT: CPT

## 2022-12-17 PROCEDURE — 87324 CLOSTRIDIUM AG IA: CPT

## 2022-12-17 PROCEDURE — 82330 ASSAY OF CALCIUM: CPT

## 2022-12-17 PROCEDURE — 80053 COMPREHEN METABOLIC PANEL: CPT

## 2022-12-17 PROCEDURE — 87040 BLOOD CULTURE FOR BACTERIA: CPT

## 2022-12-17 PROCEDURE — 82947 ASSAY GLUCOSE BLOOD QUANT: CPT

## 2022-12-17 PROCEDURE — 0225U NFCT DS DNA&RNA 21 SARSCOV2: CPT

## 2022-12-17 PROCEDURE — 83735 ASSAY OF MAGNESIUM: CPT

## 2022-12-17 PROCEDURE — 84295 ASSAY OF SERUM SODIUM: CPT

## 2022-12-17 PROCEDURE — 85610 PROTHROMBIN TIME: CPT

## 2022-12-17 PROCEDURE — 85730 THROMBOPLASTIN TIME PARTIAL: CPT

## 2022-12-17 PROCEDURE — 85018 HEMOGLOBIN: CPT

## 2022-12-17 PROCEDURE — 71046 X-RAY EXAM CHEST 2 VIEWS: CPT

## 2022-12-17 PROCEDURE — 85014 HEMATOCRIT: CPT

## 2022-12-17 RX ORDER — AMLODIPINE BESYLATE 2.5 MG/1
1 TABLET ORAL
Qty: 30 | Refills: 0
Start: 2022-12-17 | End: 2023-01-15

## 2022-12-17 RX ORDER — VANCOMYCIN HCL 1 G
2.5 VIAL (EA) INTRAVENOUS
Qty: 80 | Refills: 0
Start: 2022-12-17 | End: 2022-12-24

## 2022-12-17 RX ORDER — ACETAMINOPHEN 500 MG
3 TABLET ORAL
Qty: 0 | Refills: 0 | DISCHARGE
Start: 2022-12-17

## 2022-12-17 RX ORDER — ACYCLOVIR SODIUM 500 MG
1 VIAL (EA) INTRAVENOUS
Qty: 30 | Refills: 0
Start: 2022-12-17 | End: 2023-01-15

## 2022-12-17 RX ADMIN — Medication 3 MILLILITER(S): at 06:03

## 2022-12-17 RX ADMIN — Medication 125 MILLIGRAM(S): at 06:04

## 2022-12-17 RX ADMIN — Medication 125 MILLIGRAM(S): at 12:58

## 2022-12-17 RX ADMIN — Medication 400 MILLIGRAM(S): at 12:58

## 2022-12-17 NOTE — DISCHARGE NOTE PROVIDER - NSDCCPCAREPLAN_GEN_ALL_CORE_FT
PRINCIPAL DISCHARGE DIAGNOSIS  Diagnosis: Fever  Assessment and Plan of Treatment: Likely related to C. diff infection, No other focal symptoms to suggest alternate source  your urinalysis was unremarkable, no urinary symptoms   - blood and urine cultures are negative   -pt met sepsis criteria on arrival         SECONDARY DISCHARGE DIAGNOSES  Diagnosis: C. difficile colitis  Assessment and Plan of Treatment: You have tested postive for c.diff infection. Please complete course of antibiotics ad prescribe    Diagnosis: Asthma  Assessment and Plan of Treatment: Asthma attacks happen when the airways in the lungs become narrow and inflamed  Asthma symptoms can include - Wheezing or noisy breathing, coughing, tight feeling in the chest, shortness of breath  Almost everyone with asthma has a quick-relief inhaler that works in 5- 10mins that they carry with them and long-term controller medicines control asthma and prevent future symptoms. People with frequent asthma symptoms take these 1 or 2 times each day.  You can stay away from things that cause your symptoms or make them worse. Doctors call these "triggers."  avoid them as much as possible. Some common triggers include - Dust, mold, animals, such as dogs and cats, pollen and plants, cigarette smoke, getting sick with a cold or flu (that's why it's important to get a flu shot), stress  Talk to your caregiver about an action plan for managing asthma attacks. This includes the use of a peak flow meter which measures the severity of the attack and medicines that can help stop the attack.   SEEK MEDICAL CARE IF:  You have wheezing, shortness of breath, or a cough even if taking medicine to prevent attacks.   You have thickening of sputum, sputum changes from clear or white to yellow, green, gray, or bloody.   You have any problems that may be related to the medicines you are taking (such as a rash, itching, swelling, or trouble breathing).  You are using a reliever medicine more than 2–3 times per week.  Your peak flow is still at 50–79% of personal best after following your action plan for 1 hour.  SEEK IMMEDIATE MEDICAL CARE IF:  You are short of breath even at rest,or with very little physical activity, chest pain, heart beating fast, bluish color to your lips or fingernails, fever, dizziness,   You seem to be getting worse and are unresponsive to treatment during an asthma attack.   Your peak flow is less than 50% of personal best.      Diagnosis: Hypertension  Assessment and Plan of Treatment: Low salt diet  Activity as tolerated.  Take all medication as prescribed.  Follow up with your medical doctor for routine blood pressure monitoring at your next visit.  Notify your doctor if you have any of the following symptoms:   Dizziness, Lightheadedness, Blurry vision, Headache, Chest pain, Shortness of breath      Diagnosis: Multiple myeloma  Assessment and Plan of Treatment: Please follow up with your Oncoloogist

## 2022-12-17 NOTE — PROGRESS NOTE ADULT - ASSESSMENT
Patient is a 57y old  Male who presents with a chief complaint of fever    multiple myeloma  --under the Dr. Baig at [facility] and Dr. Hughes at [facility] [place].  --currently on treatment with Peggy RVD,  LD 12/7   --supportive care w/  Zarxio 480 mcg x 2 (LD 12/14), Reacrit 60,000 units LD 12/13 and Xgeva LD 12/7 in office.  -- ongoing care after discharge  --routine ppx antivirals and abx    Fevers  --Minimal bibasilar atelectasis noted on chest x-ray  --C.Diff positive  --on PO vanco for coverage  --ID consult appreciated   --resolved    to d/c home today. d/w pt.     Patient is a 57y old  Male who presents with a chief complaint of fever    multiple myeloma  --under the Dr. Baig at [facility] and Dr. Hughes at [facility] [place].  --currently on treatment with Peggy RVD,  LD 12/7   --supportive care w/  Zarxio 480 mcg x 2 (LD 12/14), Reacrit 60,000 units LD 12/13 and Xgeva LD 12/7 in office.  -- ongoing care after discharge  --routine ppx antivirals and abx    Fevers  --Minimal bibasilar atelectasis noted on chest x-ray  --C.Diff positive  --on PO vanco for coverage  --ID consult appreciated   --resolved    hgb adequate, plts slightly lower, would monitor, f/u as outpt, may be due to meds or cdiff    to d/c home today. d/w pt.

## 2022-12-17 NOTE — DISCHARGE NOTE PROVIDER - NSDCMRMEDTOKEN_GEN_ALL_CORE_FT
acetaminophen 325 mg oral tablet: 3 tab(s) orally every 6 hours, As needed, Temp greater or equal to 38C (100.4F), Mild Pain (1 - 3), Moderate Pain (4 - 6), Severe Pain (7 - 10)  acyclovir 400 mg oral tablet: 1 tab(s) orally once a day  Albuterol (Eqv-ProAir HFA) 90 mcg/inh inhalation aerosol: 2 puff(s) inhaled every 6 hours, As Needed  amLODIPine 10 mg oral tablet: 1 tab(s) orally once a day  Bactrim  mg-160 mg oral tablet: 1 tab(s) orally 2 times a day only on MWF  mirtazapine 7.5 mg oral tablet: 1 tab(s) orally once a day (at bedtime), As Needed  vancomycin 50 mg/mL oral liquid: 2.5 milliliter(s) orally every 6 hours

## 2022-12-17 NOTE — PROGRESS NOTE ADULT - SUBJECTIVE AND OBJECTIVE BOX
Patient is a 57y old  Male who presents with a chief complaint of fever (16 Dec 2022 14:18)    Patient seen and examined this afternoon. Patient reports feeling better, denies further instances of diarrhea.    MEDICATIONS  (STANDING):  acyclovir   Oral Tab/Cap 400 milliGRAM(s) Oral daily  albuterol/ipratropium for Nebulization 3 milliLiter(s) Nebulizer every 6 hours  trimethoprim  160 mG/sulfamethoxazole 800 mG 1 Tablet(s) Oral <User Schedule>  vancomycin    Solution 125 milliGRAM(s) Oral every 6 hours    MEDICATIONS  (PRN):  acetaminophen     Tablet .. 975 milliGRAM(s) Oral every 6 hours PRN Temp greater or equal to 38C (100.4F), Mild Pain (1 - 3), Moderate Pain (4 - 6), Severe Pain (7 - 10)  mirtazapine 7.5 milliGRAM(s) Oral at bedtime PRN insomnia      ROS  No fever, sweats, chills  No epistaxis, HA, sore throat  No CP, SOB, cough, sputum  No n/v/d, abd pain, melena, hematochezia  No edema  No rash  No anxiety  No back pain, joint pain  No bleeding, bruising  No dysuria, hematuria    Vital Signs Last 24 Hrs  T(C): 36.7 (16 Dec 2022 14:32), Max: 36.8 (15 Dec 2022 20:57)  T(F): 98.1 (16 Dec 2022 14:32), Max: 98.3 (15 Dec 2022 20:57)  HR: 75 (16 Dec 2022 14:32) (70 - 82)  BP: 108/68 (16 Dec 2022 14:32) (104/66 - 114/69)  BP(mean): --  RR: 19 (16 Dec 2022 14:32) (18 - 19)  SpO2: 93% (16 Dec 2022 14:32) (93% - 95%)    Parameters below as of 16 Dec 2022 14:32  Patient On (Oxygen Delivery Method): room air        PE  NAD  Awake, alert  Anicteric, MMM  RRR  CTAB  Abd soft, NT, ND  No c/c/e  No rash grossly  FROM                          8.6    10.31 )-----------( 54       ( 16 Dec 2022 07:10 )             27.2       12-16    134<L>  |  110<H>  |  20  ----------------------------<  75  4.6   |  12<L>  |  0.98    Ca    7.3<L>      16 Dec 2022 07:09  Phos  2.7     12-16  Mg     2.5     12-16    TPro  7.7  /  Alb  3.2<L>  /  TBili  0.2  /  DBili  x   /  AST  74<H>  /  ALT  22  /  AlkPhos  57  12-16      
Patient is a 57y old  Male who presents with a chief complaint of fever (15 Dec 2022 16:04)    Date of servie : 12-15-22 @ 17:19  INTERVAL HPI/OVERNIGHT EVENTS:  T(C): 36.6 (12-15-22 @ 14:46), Max: 37.2 (22 @ 18:18)  HR: 84 (12-15-22 @ 14:46) (77 - 96)  BP: 111/74 (12-15-22 @ 14:46) (89/54 - 116/77)  RR: 17 (12-15-22 @ 14:46) (15 - 19)  SpO2: 97% (12-15-22 @ 14:46) (96% - 97%)  Wt(kg): --  I&O's Summary      LABS:                        8.2    4.64  )-----------( 67       ( 14 Dec 2022 16:52 )             25.5     12-    132<L>  |  104  |  24<H>  ----------------------------<  79  4.1   |  17<L>  |  1.29    Ca    7.8<L>      14 Dec 2022 16:52    TPro  8.4<H>  /  Alb  3.5  /  TBili  0.3  /  DBili  x   /  AST  70<H>  /  ALT  21  /  AlkPhos  51  12-14    PT/INR - ( 14 Dec 2022 16:52 )   PT: 14.0 sec;   INR: 1.21 ratio         PTT - ( 14 Dec 2022 16:52 )  PTT:35.0 sec  Urinalysis Basic - ( 14 Dec 2022 17:31 )    Color: Yellow / Appearance: Clear / S.025 / pH: x  Gluc: x / Ketone: Negative  / Bili: Negative / Urobili: Negative   Blood: x / Protein: 100 mg/dl / Nitrite: Negative   Leuk Esterase: Negative / RBC: 6 /hpf / WBC 8 /HPF   Sq Epi: x / Non Sq Epi: 2 /hpf / Bacteria: Negative      CAPILLARY BLOOD GLUCOSE            Urinalysis Basic - ( 14 Dec 2022 17:31 )    Color: Yellow / Appearance: Clear / S.025 / pH: x  Gluc: x / Ketone: Negative  / Bili: Negative / Urobili: Negative   Blood: x / Protein: 100 mg/dl / Nitrite: Negative   Leuk Esterase: Negative / RBC: 6 /hpf / WBC 8 /HPF   Sq Epi: x / Non Sq Epi: 2 /hpf / Bacteria: Negative        MEDICATIONS  (STANDING):  acyclovir   Oral Tab/Cap 400 milliGRAM(s) Oral daily  albuterol/ipratropium for Nebulization 3 milliLiter(s) Nebulizer every 6 hours  trimethoprim  160 mG/sulfamethoxazole 800 mG 1 Tablet(s) Oral <User Schedule>  vancomycin    Solution 125 milliGRAM(s) Oral every 6 hours    MEDICATIONS  (PRN):  acetaminophen     Tablet .. 975 milliGRAM(s) Oral every 6 hours PRN Temp greater or equal to 38C (100.4F), Mild Pain (1 - 3), Moderate Pain (4 - 6), Severe Pain (7 - 10)  mirtazapine 7.5 milliGRAM(s) Oral at bedtime PRN insomnia          PHYSICAL EXAM:  GENERAL: NAD, well-groomed, well-developed  HEAD:  Atraumatic, Normocephalic  CHEST/LUNG: Clear to percussion bilaterally; No rales, rhonchi, wheezing, or rubs  HEART: Regular rate and rhythm; No murmurs, rubs, or gallops  ABDOMEN: Soft, Nontender, Nondistended; Bowel sounds present  EXTREMITIES:  2+ Peripheral Pulses, No clubbing, cyanosis, or edema  LYMPH: No lymphadenopathy noted  SKIN: No rashes or lesions    Care Discussed with Consultants/Other Providers [ ] YES  [ ] NO
CC: F/U C diff    Saw/spoke to patient. No fevers, no chills. No new complaints. Diarrhea has improved.    Allergies  No Known Allergies    ANTIMICROBIALS:  acyclovir   Oral Tab/Cap 400 daily  trimethoprim  160 mG/sulfamethoxazole 800 mG 1 <User Schedule>  vancomycin    Solution 125 every 6 hours    PE:    Vital Signs Last 24 Hrs  T(C): 36.7 (16 Dec 2022 14:32), Max: 36.8 (15 Dec 2022 20:57)  T(F): 98.1 (16 Dec 2022 14:32), Max: 98.3 (15 Dec 2022 20:57)  HR: 75 (16 Dec 2022 14:32) (70 - 82)  BP: 108/68 (16 Dec 2022 14:32) (104/66 - 114/69)  RR: 19 (16 Dec 2022 14:32) (18 - 19)  SpO2: 93% (16 Dec 2022 14:32) (93% - 95%)    Gen: AOx3, NAD, non-toxic  CV: Nontachycardic  Resp: Breathing comfortably, RA  Abd: Soft, nontender  IV/Skin: No thrombophlebitis    LABS:                        8.6    10.31 )-----------( 54       ( 16 Dec 2022 07:10 )             27.2     12-16    134<L>  |  110<H>  |  20  ----------------------------<  75  4.6   |  12<L>  |  0.98    Ca    7.3<L>      16 Dec 2022 07:09  Phos  2.7     12-16  Mg     2.5     12-16    TPro  7.7  /  Alb  3.2<L>  /  TBili  0.2  /  DBili  x   /  AST  74<H>  /  ALT  22  /  AlkPhos  57  12-16    Urinalysis Basic - ( 14 Dec 2022 17:31 )    Color: Yellow / Appearance: Clear / S.025 / pH: x  Gluc: x / Ketone: Negative  / Bili: Negative / Urobili: Negative   Blood: x / Protein: 100 mg/dl / Nitrite: Negative   Leuk Esterase: Negative / RBC: 6 /hpf / WBC 8 /HPF   Sq Epi: x / Non Sq Epi: 2 /hpf / Bacteria: Negative    MICROBIOLOGY:    Clean Catch Clean Catch (Midstream)  22   No growth  --  --    .Blood Blood-Peripheral  22   No growth to date.  --  --    .Blood Blood-Peripheral  22   No growth to date.  --  --    Rapid RVP Result: NotDetec ( @ 16:52)    Clostridium difficile GDH Toxins A&amp;B, EIA:   Positive (12-15-22 @ 07:49)  Clostridium difficile GDH Interpretation: Positive for toxigenic C. Difficile.  This specimen is positive for C.  Difficile glutamate dehydrogenase (GDH) antigen and positive for C.  Difficile Toxins A & B, by EIA.  GDH is a highly sensitive marker for C.  Difficile that is produced in largeamounts by all C. Difficile strains,  both toxigenic and nontoxigenic.  This assay has not been validated as a  test of cure.  The results of this assay should always be interpreted in  conjunction with patient's clinical history. (12-15-22 @ 07:49)    RADIOLOGY:     XR:    FINDINGS:    The heart is normal in size. Right chest wall stimulator with lead   overlying the neck Minimal bibasilar subsegmental atelectasis There is no   pneumothorax. There is no pleural effusion.    No acute osseous abnormalities.    IMPRESSION:  Minimal bibasilar atelectasis.  
Patient is a 57y old  Male who presents with a chief complaint of fever (15 Dec 2022 17:18)    Date of servie : 22 @ 14:17  INTERVAL HPI/OVERNIGHT EVENTS:  T(C): 36.6 (22 @ 04:47), Max: 36.8 (12-15-22 @ 20:57)  HR: 70 (22 @ 04:47) (70 - 84)  BP: 104/66 (22 @ 04:47) (104/66 - 114/69)  RR: 18 (22 @ 04:47) (17 - 18)  SpO2: 95% (22 @ 04:47) (95% - 97%)  Wt(kg): --  I&O's Summary      LABS:                        8.6    10.31 )-----------( 54       ( 16 Dec 2022 07:10 )             27.2     12-16    134<L>  |  110<H>  |  20  ----------------------------<  75  4.6   |  12<L>  |  0.98    Ca    7.3<L>      16 Dec 2022 07:09  Phos  2.7     12-16  Mg     2.5     12-16    TPro  7.7  /  Alb  3.2<L>  /  TBili  0.2  /  DBili  x   /  AST  74<H>  /  ALT  22  /  AlkPhos  57  12-16    PT/INR - ( 14 Dec 2022 16:52 )   PT: 14.0 sec;   INR: 1.21 ratio         PTT - ( 14 Dec 2022 16:52 )  PTT:35.0 sec  Urinalysis Basic - ( 14 Dec 2022 17:31 )    Color: Yellow / Appearance: Clear / S.025 / pH: x  Gluc: x / Ketone: Negative  / Bili: Negative / Urobili: Negative   Blood: x / Protein: 100 mg/dl / Nitrite: Negative   Leuk Esterase: Negative / RBC: 6 /hpf / WBC 8 /HPF   Sq Epi: x / Non Sq Epi: 2 /hpf / Bacteria: Negative      CAPILLARY BLOOD GLUCOSE            Urinalysis Basic - ( 14 Dec 2022 17:31 )    Color: Yellow / Appearance: Clear / S.025 / pH: x  Gluc: x / Ketone: Negative  / Bili: Negative / Urobili: Negative   Blood: x / Protein: 100 mg/dl / Nitrite: Negative   Leuk Esterase: Negative / RBC: 6 /hpf / WBC 8 /HPF   Sq Epi: x / Non Sq Epi: 2 /hpf / Bacteria: Negative        MEDICATIONS  (STANDING):  acyclovir   Oral Tab/Cap 400 milliGRAM(s) Oral daily  albuterol/ipratropium for Nebulization 3 milliLiter(s) Nebulizer every 6 hours  trimethoprim  160 mG/sulfamethoxazole 800 mG 1 Tablet(s) Oral <User Schedule>  vancomycin    Solution 125 milliGRAM(s) Oral every 6 hours    MEDICATIONS  (PRN):  acetaminophen     Tablet .. 975 milliGRAM(s) Oral every 6 hours PRN Temp greater or equal to 38C (100.4F), Mild Pain (1 - 3), Moderate Pain (4 - 6), Severe Pain (7 - 10)  mirtazapine 7.5 milliGRAM(s) Oral at bedtime PRN insomnia          PHYSICAL EXAM:  GENERAL: NAD, well-groomed, well-developed  HEAD:  Atraumatic, Normocephalic  CHEST/LUNG: Clear to percussion bilaterally; No rales, rhonchi, wheezing, or rubs  HEART: Regular rate and rhythm; No murmurs, rubs, or gallops  ABDOMEN: Soft, Nontender, Nondistended; Bowel sounds present  EXTREMITIES:  2+ Peripheral Pulses, No clubbing, cyanosis, or edema  LYMPH: No lymphadenopathy noted  SKIN: No rashes or lesions    Care Discussed with Consultants/Other Providers [ ] YES  [ ] NO
Pt seen, diarrhea gone, waiting for dc    MEDICATIONS  (STANDING):  acyclovir   Oral Tab/Cap 400 milliGRAM(s) Oral daily  albuterol/ipratropium for Nebulization 3 milliLiter(s) Nebulizer every 6 hours  trimethoprim  160 mG/sulfamethoxazole 800 mG 1 Tablet(s) Oral <User Schedule>  vancomycin    Solution 125 milliGRAM(s) Oral every 6 hours    MEDICATIONS  (PRN):  acetaminophen     Tablet .. 975 milliGRAM(s) Oral every 6 hours PRN Temp greater or equal to 38C (100.4F), Mild Pain (1 - 3), Moderate Pain (4 - 6), Severe Pain (7 - 10)  mirtazapine 7.5 milliGRAM(s) Oral at bedtime PRN insomnia      ROS  No fever, sweats, chills  No epistaxis, HA, sore throat  No CP, SOB, cough, sputum  No n/v/d, abd pain, melena, hematochezia  No edema  No rash  No anxiety  No back pain, joint pain  No bleeding, bruising  No dysuria, hematuria    Vital Signs Last 24 Hrs  T(C): 36.4 (17 Dec 2022 12:36), Max: 36.8 (16 Dec 2022 20:22)  T(F): 97.5 (17 Dec 2022 12:36), Max: 98.3 (16 Dec 2022 20:22)  HR: 69 (17 Dec 2022 12:36) (69 - 74)  BP: 123/79 (17 Dec 2022 12:36) (122/80 - 124/76)  BP(mean): --  RR: 19 (17 Dec 2022 12:36) (18 - 19)  SpO2: 96% (17 Dec 2022 12:36) (94% - 98%)    Parameters below as of 17 Dec 2022 12:36  Patient On (Oxygen Delivery Method): room air        PE  NAD  Awake, alert  Anicteric  limited 2/2 covid pandemic                          8.6    10.31 )-----------( 54       ( 16 Dec 2022 07:10 )             27.2       12-16    134<L>  |  110<H>  |  20  ----------------------------<  75  4.6   |  12<L>  |  0.98    Ca    7.3<L>      16 Dec 2022 07:09  Phos  2.7     12-16  Mg     2.5     12-16    TPro  7.7  /  Alb  3.2<L>  /  TBili  0.2  /  DBili  x   /  AST  74<H>  /  ALT  22  /  AlkPhos  57  12-16      
Date of Service: 22 @ 14:18    Patient is a 57y old  Male who presents with a chief complaint of fever (15 Dec 2022 17:18)      Any change in ROS: seems pretty good:  no sob: on room air:  no symptoms     MEDICATIONS  (STANDING):  acyclovir   Oral Tab/Cap 400 milliGRAM(s) Oral daily  albuterol/ipratropium for Nebulization 3 milliLiter(s) Nebulizer every 6 hours  trimethoprim  160 mG/sulfamethoxazole 800 mG 1 Tablet(s) Oral <User Schedule>  vancomycin    Solution 125 milliGRAM(s) Oral every 6 hours    MEDICATIONS  (PRN):  acetaminophen     Tablet .. 975 milliGRAM(s) Oral every 6 hours PRN Temp greater or equal to 38C (100.4F), Mild Pain (1 - 3), Moderate Pain (4 - 6), Severe Pain (7 - 10)  mirtazapine 7.5 milliGRAM(s) Oral at bedtime PRN insomnia    Vital Signs Last 24 Hrs  T(C): 36.6 (16 Dec 2022 04:47), Max: 36.8 (15 Dec 2022 20:57)  T(F): 97.8 (16 Dec 2022 04:47), Max: 98.3 (15 Dec 2022 20:57)  HR: 70 (16 Dec 2022 04:47) (70 - 84)  BP: 104/66 (16 Dec 2022 04:47) (104/66 - 114/69)  BP(mean): --  RR: 18 (16 Dec 2022 04:47) (17 - 18)  SpO2: 95% (16 Dec 2022 04:47) (95% - 97%)    Parameters below as of 16 Dec 2022 04:47  Patient On (Oxygen Delivery Method): room air        I&O's Summary        Physical Exam:   GENERAL: NAD, well-groomed, well-developed  HEENT: KIMMY/   Atraumatic, Normocephalic  ENMT: No tonsillar erythema, exudates, or enlargement; Moist mucous membranes, Good dentition, No lesions  NECK: Supple, No JVD, Normal thyroid  CHEST/LUNG: Clear to auscultaion  CVS: Regular rate and rhythm; No murmurs, rubs, or gallops  GI: : Soft, Nontender, Nondistended; Bowel sounds present  NERVOUS SYSTEM:  Alert & Oriented X3  EXTREMITIES:  2+ Peripheral Pulses, No clubbing, cyanosis, or edema  LYMPH: No lymphadenopathy noted  SKIN: No rashes or lesions  ENDOCRINOLOGY: No Thyromegaly  PSYCH: Appropriate    Labs:  18                            8.6    10.31 )-----------( 54       ( 16 Dec 2022 07:10 )             27.2                         8.2    4.64  )-----------( 67       ( 14 Dec 2022 16:52 )             25.5     12-16    134<L>  |  110<H>  |  20  ----------------------------<  75  4.6   |  12<L>  |  0.98  12-14    132<L>  |  104  |  24<H>  ----------------------------<  79  4.1   |  17<L>  |  1.29    Ca    7.3<L>      16 Dec 2022 07:09  Ca    7.8<L>      14 Dec 2022 16:52  Phos  2.7     12-16  Mg     2.5     12-16    TPro  7.7  /  Alb  3.2<L>  /  TBili  0.2  /  DBili  x   /  AST  74<H>  /  ALT  22  /  AlkPhos  57  12-16  TPro  8.4<H>  /  Alb  3.5  /  TBili  0.3  /  DBili  x   /  AST  70<H>  /  ALT  21  /  AlkPhos  51  12-14    CAPILLARY BLOOD GLUCOSE          LIVER FUNCTIONS - ( 16 Dec 2022 07:09 )  Alb: 3.2 g/dL / Pro: 7.7 g/dL / ALK PHOS: 57 U/L / ALT: 22 U/L / AST: 74 U/L / GGT: x           PT/INR - ( 14 Dec 2022 16:52 )   PT: 14.0 sec;   INR: 1.21 ratio         PTT - ( 14 Dec 2022 16:52 )  PTT:35.0 sec  Urinalysis Basic - ( 14 Dec 2022 17:31 )    Color: Yellow / Appearance: Clear / S.025 / pH: x  Gluc: x / Ketone: Negative  / Bili: Negative / Urobili: Negative   Blood: x / Protein: 100 mg/dl / Nitrite: Negative   Leuk Esterase: Negative / RBC: 6 /hpf / WBC 8 /HPF   Sq Epi: x / Non Sq Epi: 2 /hpf / Bacteria: Negative            RECENT CULTURES:   @ 17:31 Clean Catch Clean Catch (Midstream)                No growth     @ 17:15 .Blood Blood-Peripheral                No growth to date.     @ 16:30 .Blood Blood-Peripheral         rad< from: Xray Chest 2 Views PA/Lat (22 @ 18:23) >      INTERPRETATION:  EXAMINATION: XR CHEST PA AND LATERAL    CLINICAL INDICATION: eval for pneumonia, cough on chemotherapy.    TECHNIQUE: Frontal andlateral views of the chest were obtained.    COMPARISON: None.    FINDINGS:    The heart is normal in size. Right chest wall stimulator with lead   overlying the neck Minimal bibasilar subsegmental atelectasis There is no   pneumothorax. There is no pleural effusion.    No acute osseous abnormalities.    IMPRESSION:  Minimal bibasilar atelectasis.    --- End of Report ---           EDWARD BUNDY MD; Resident Radiologist  This document has been electronically signed.  ABHIJEET ROJAS MD; Attending Radiologist  This document has been electronically signed. Dec 15 2022 12:10PM    < end of copied text >         No growth to date.          RESPIRATORY CULTURES:      Clostridium difficile GD Toxins A&amp;B, EIA:   Positive (12-15 @ 07:49)      Studies  Chest X-RAY  CT SCAN Chest   Venous Dopplers: LE:   CT Abdomen  Others

## 2022-12-17 NOTE — DISCHARGE NOTE NURSING/CASE MANAGEMENT/SOCIAL WORK - NSDCPEFALRISK_GEN_ALL_CORE
For information on Fall & Injury Prevention, visit: [email or web address] OR  [email or web address] OR  https://www.cdc.gov/steadi/patient.html

## 2022-12-17 NOTE — DISCHARGE NOTE NURSING/CASE MANAGEMENT/SOCIAL WORK - PATIENT PORTAL LINK FT
You can access the FollowMyHealth Patient Portal offered by [facility] by registering at the following website: [email or web address]. By joining [employer]’s FollowMyHealth portal, you will also be able to view your health information using other applications (apps) compatible with our system.

## 2022-12-17 NOTE — DISCHARGE NOTE PROVIDER - HOSPITAL COURSE
56yo M w/ PMHx of Ig Kappa MM (on chemo), HTN, Asthma, LINNEA, presents with fever        Fever  ·  Plan: -unknown etiology, has cough w/ clear sputum and rhonchi/wheezing on exam, CXR w/ mild atelectasis  -U/A unremarkable, no urinary symptoms   -f/u blood and urine cultures  -pt met sepsis criteria on arrival   -Tylenol 975mg q6h PRN fever   - abx as per ID      C diff   Overall, C diff colitis, fever, diarrhea  - Vanco 125mg po q 6, 10 days then DC  - F/U BCX, UCX->negative   - Monitor BMs, monitor stool counts  - Monitor for alternate sources     Asthma   ·  Plan: -known asthma, has mild wheezing on exam but no hypoxia  -start duonebs q6h  -will hold steroids due to possible high risk infection.     Multiple myeloma  ·  Plan: -appreciate hematology/oncology recommendations  -c/w Bactrim ppx-c/w Acyclovir ppx  -hold home Revlimid.     Hypertension  ·  Plan: -hold home amlodipine due to hypotension.     LINNEA (obstructive sleep apnea)   ·  Plan: -pt reports he has not used his nocturnal machine in several months and he reports he does not want it now while hospitalized.    Pt is medically stable for discharge and to follow up with his PCP/Oncologist

## 2022-12-17 NOTE — DISCHARGE NOTE PROVIDER - CARE PROVIDER_API CALL
Erik Baig)  Internal Medicine  [address] [place] [address]  Phone: [phone]  Fax: [phone]  Follow Up Time:

## 2022-12-19 LAB
CULTURE RESULTS: SIGNIFICANT CHANGE UP
CULTURE RESULTS: SIGNIFICANT CHANGE UP
SPECIMEN SOURCE: SIGNIFICANT CHANGE UP
SPECIMEN SOURCE: SIGNIFICANT CHANGE UP

## 2023-01-11 ENCOUNTER — APPOINTMENT (OUTPATIENT)
Dept: PULMONOLOGY | Facility: CLINIC | Age: 58
End: 2023-01-11
Payer: COMMERCIAL

## 2023-01-11 VITALS
TEMPERATURE: 98 F | WEIGHT: 242 LBS | SYSTOLIC BLOOD PRESSURE: 135 MMHG | DIASTOLIC BLOOD PRESSURE: 81 MMHG | HEART RATE: 91 BPM | BODY MASS INDEX: 32.07 KG/M2 | HEIGHT: 73 IN

## 2023-01-11 DIAGNOSIS — G47.33 OBSTRUCTIVE SLEEP APNEA (ADULT) (PEDIATRIC): ICD-10-CM

## 2023-01-11 DIAGNOSIS — J44.9 CHRONIC OBSTRUCTIVE PULMONARY DISEASE, UNSPECIFIED: ICD-10-CM

## 2023-01-11 PROCEDURE — 94726 PLETHYSMOGRAPHY LUNG VOLUMES: CPT

## 2023-01-11 PROCEDURE — 94729 DIFFUSING CAPACITY: CPT

## 2023-01-11 PROCEDURE — 94060 EVALUATION OF WHEEZING: CPT

## 2023-01-11 PROCEDURE — 99203 OFFICE O/P NEW LOW 30 MIN: CPT | Mod: 25

## 2023-01-11 PROCEDURE — ZZZZZ: CPT

## 2023-01-11 RX ORDER — ALBUTEROL SULFATE 90 UG/1
108 (90 BASE) INHALANT RESPIRATORY (INHALATION) EVERY 6 HOURS
Qty: 1 | Refills: 5 | Status: ACTIVE | COMMUNITY
Start: 2023-01-11 | End: 1900-01-01

## 2023-01-12 NOTE — HISTORY OF PRESENT ILLNESS
[Former] : former [< 20 pack-years] : < 20 pack-years [TextBox_4] : 57 y.o. male with multiple myeloma, HTN, LINNEA and COPD here to establish care. The patient was admitted for [facility] in November for PNA after receiving his first dose of chemo for his MM. The patient was then discharged and was admitted to [facility] for C. diff colitis. The patient has now recovered but was referred by his hematologist, Dr. Erik Baig of [facility] due to SOB on activity. The patient states he has been SOB for several months, and is unable to walk a flight of stairs before getting SOB. Also endorses a productive cough since his PNA and some wheezing. The patient has an albuterol HFA inhaler to use which provides relief. Also given trelegy inhaler, but the patient did not find relief when using. The patient also has anemia due to his MM and chemo treatment and is on procrit. Patient is on CPAP for his LINNEA, that is managed by [facility] sleep medicine. \par \par  [TextBox_11] : .5 [TextBox_13] : 20 [YearQuit] : 2022

## 2023-01-12 NOTE — ASSESSMENT
[FreeTextEntry1] : 57 y.o. male with multiple myeloma, HTN, LINNEA and COPD here to establish care. Patient's PFT is consistent with an obstructive sleep pattern. Given his smoking hx, the patient has COPD. HIs dyspnea is multifactorial from his COPD, anemia and recent PNA \par \par Plan \par - Started Anoro Ellipta once a day \par - c/w Albuterol PRN for SOB \par - Patient will need repeat chest imaging by next visit to assess for resolution of PNA \par - c/w CPAP for LINNEA \par - UTD on all immunizations \par \par d/w Dr. Lisker

## 2023-01-19 DIAGNOSIS — R93.89 ABNORMAL FINDINGS ON DIAGNOSTIC IMAGING OF OTHER SPECIFIED BODY STRUCTURES: ICD-10-CM

## 2023-02-23 ENCOUNTER — RESULT REVIEW (OUTPATIENT)
Age: 58
End: 2023-02-23

## 2023-02-23 ENCOUNTER — APPOINTMENT (OUTPATIENT)
Dept: CT IMAGING | Facility: CLINIC | Age: 58
End: 2023-02-23
Payer: COMMERCIAL

## 2023-02-23 ENCOUNTER — OUTPATIENT (OUTPATIENT)
Dept: OUTPATIENT SERVICES | Facility: HOSPITAL | Age: 58
LOS: 1 days | End: 2023-02-23
Payer: COMMERCIAL

## 2023-02-23 DIAGNOSIS — R93.89 ABNORMAL FINDINGS ON DIAGNOSTIC IMAGING OF OTHER SPECIFIED BODY STRUCTURES: ICD-10-CM

## 2023-02-23 DIAGNOSIS — Z98.890 OTHER SPECIFIED POSTPROCEDURAL STATES: Chronic | ICD-10-CM

## 2023-02-23 PROCEDURE — 71250 CT THORAX DX C-: CPT | Mod: 26

## 2023-02-23 PROCEDURE — 71250 CT THORAX DX C-: CPT

## 2023-02-23 RX ORDER — LENALIDOMIDE 5 MG/1
1 CAPSULE ORAL
Qty: 0 | Refills: 0 | DISCHARGE

## 2023-02-23 RX ORDER — AMLODIPINE BESYLATE 2.5 MG/1
1 TABLET ORAL
Qty: 0 | Refills: 0 | DISCHARGE

## 2023-02-23 RX ORDER — ALBUTEROL 90 UG/1
2 AEROSOL, METERED ORAL
Qty: 0 | Refills: 0 | DISCHARGE

## 2023-02-23 RX ORDER — MIRTAZAPINE 45 MG/1
1 TABLET, ORALLY DISINTEGRATING ORAL
Qty: 0 | Refills: 0 | DISCHARGE

## 2023-02-23 RX ORDER — ACYCLOVIR SODIUM 500 MG
1 VIAL (EA) INTRAVENOUS
Qty: 0 | Refills: 0 | DISCHARGE

## 2023-03-02 RX ORDER — UMECLIDINIUM BROMIDE AND VILANTEROL TRIFENATATE 62.5; 25 UG/1; UG/1
62.5-25 POWDER RESPIRATORY (INHALATION) DAILY
Qty: 1 | Refills: 2 | Status: ACTIVE | COMMUNITY
Start: 2023-01-11 | End: 1900-01-01

## 2023-03-07 ENCOUNTER — TRANSCRIPTION ENCOUNTER (OUTPATIENT)
Age: 58
End: 2023-03-07

## 2023-04-04 ENCOUNTER — APPOINTMENT (OUTPATIENT)
Dept: CT IMAGING | Facility: CLINIC | Age: 58
End: 2023-04-04

## 2023-08-03 NOTE — PATIENT PROFILE ADULT - INFLUENZA IMMUNIZATION DATE (APPROXIMATE)
01-Nov-2022 Double Island Pedicle Flap Text: The defect edges were debeveled with a #15 scalpel blade.  Given the location of the defect, shape of the defect and the proximity to free margins a double island pedicle advancement flap was deemed most appropriate.  Using a sterile surgical marker, an appropriate advancement flap was drawn incorporating the defect, outlining the appropriate donor tissue and placing the expected incisions within the relaxed skin tension lines where possible.    The area thus outlined was incised deep to adipose tissue with a #15 scalpel blade.  The skin margins were undermined to an appropriate distance in all directions around the primary defect and laterally outward around the island pedicle utilizing iris scissors.  There was minimal undermining beneath the pedicle flap.

## 2023-09-18 ENCOUNTER — INPATIENT (INPATIENT)
Facility: HOSPITAL | Age: 58
LOS: 1 days | Discharge: HOME | End: 2023-09-20
Attending: STUDENT IN AN ORGANIZED HEALTH CARE EDUCATION/TRAINING PROGRAM | Admitting: STUDENT IN AN ORGANIZED HEALTH CARE EDUCATION/TRAINING PROGRAM
Payer: COMMERCIAL

## 2023-09-18 VITALS
SYSTOLIC BLOOD PRESSURE: 100 MMHG | DIASTOLIC BLOOD PRESSURE: 66 MMHG | HEART RATE: 125 BPM | OXYGEN SATURATION: 98 % | WEIGHT: 240.08 LBS | RESPIRATION RATE: 20 BRPM | HEIGHT: 72 IN | TEMPERATURE: 100 F

## 2023-09-18 DIAGNOSIS — I10 ESSENTIAL (PRIMARY) HYPERTENSION: ICD-10-CM

## 2023-09-18 DIAGNOSIS — G47.33 OBSTRUCTIVE SLEEP APNEA (ADULT) (PEDIATRIC): ICD-10-CM

## 2023-09-18 DIAGNOSIS — Z98.890 OTHER SPECIFIED POSTPROCEDURAL STATES: Chronic | ICD-10-CM

## 2023-09-18 DIAGNOSIS — N17.9 ACUTE KIDNEY FAILURE, UNSPECIFIED: ICD-10-CM

## 2023-09-18 DIAGNOSIS — Z29.9 ENCOUNTER FOR PROPHYLACTIC MEASURES, UNSPECIFIED: ICD-10-CM

## 2023-09-18 DIAGNOSIS — S22.39XA FRACTURE OF ONE RIB, UNSPECIFIED SIDE, INITIAL ENCOUNTER FOR CLOSED FRACTURE: ICD-10-CM

## 2023-09-18 DIAGNOSIS — J18.9 PNEUMONIA, UNSPECIFIED ORGANISM: ICD-10-CM

## 2023-09-18 DIAGNOSIS — C90.00 MULTIPLE MYELOMA NOT HAVING ACHIEVED REMISSION: ICD-10-CM

## 2023-09-18 PROBLEM — E78.5 HYPERLIPIDEMIA, UNSPECIFIED: Chronic | Status: ACTIVE | Noted: 2022-12-14

## 2023-09-18 PROBLEM — J45.909 UNSPECIFIED ASTHMA, UNCOMPLICATED: Chronic | Status: ACTIVE | Noted: 2022-12-14

## 2023-09-18 LAB
ALBUMIN SERPL ELPH-MCNC: 2.7 G/DL — LOW (ref 3.3–5)
ALP SERPL-CCNC: 36 U/L — LOW (ref 40–120)
ALT FLD-CCNC: 14 U/L — SIGNIFICANT CHANGE UP (ref 12–78)
ANION GAP SERPL CALC-SCNC: 6 MMOL/L — SIGNIFICANT CHANGE UP (ref 5–17)
APPEARANCE UR: CLEAR — SIGNIFICANT CHANGE UP
APTT BLD: 34.3 SEC — SIGNIFICANT CHANGE UP (ref 24.5–35.6)
AST SERPL-CCNC: 47 U/L — HIGH (ref 15–37)
BASOPHILS # BLD AUTO: 0 K/UL — SIGNIFICANT CHANGE UP (ref 0–0.2)
BASOPHILS NFR BLD AUTO: 0 % — SIGNIFICANT CHANGE UP (ref 0–2)
BILIRUB SERPL-MCNC: 0.3 MG/DL — SIGNIFICANT CHANGE UP (ref 0.2–1.2)
BILIRUB UR-MCNC: NEGATIVE — SIGNIFICANT CHANGE UP
BUN SERPL-MCNC: 24 MG/DL — HIGH (ref 7–23)
CALCIUM SERPL-MCNC: 9.8 MG/DL — SIGNIFICANT CHANGE UP (ref 8.5–10.1)
CHLORIDE SERPL-SCNC: 106 MMOL/L — SIGNIFICANT CHANGE UP (ref 96–108)
CO2 SERPL-SCNC: 22 MMOL/L — SIGNIFICANT CHANGE UP (ref 22–31)
COLOR SPEC: YELLOW — SIGNIFICANT CHANGE UP
COMMENT - URINE: SIGNIFICANT CHANGE UP
CREAT SERPL-MCNC: 1.45 MG/DL — HIGH (ref 0.5–1.3)
DIFF PNL FLD: NEGATIVE — SIGNIFICANT CHANGE UP
EGFR: 55 ML/MIN/1.73M2 — LOW
EOSINOPHIL # BLD AUTO: 0.15 K/UL — SIGNIFICANT CHANGE UP (ref 0–0.5)
EOSINOPHIL NFR BLD AUTO: 3 % — SIGNIFICANT CHANGE UP (ref 0–6)
GLUCOSE SERPL-MCNC: 94 MG/DL — SIGNIFICANT CHANGE UP (ref 70–99)
GLUCOSE UR QL: NEGATIVE MG/DL — SIGNIFICANT CHANGE UP
HCT VFR BLD CALC: 23.6 % — LOW (ref 39–50)
HGB BLD-MCNC: 7.4 G/DL — LOW (ref 13–17)
HYPOCHROMIA BLD QL: SLIGHT — SIGNIFICANT CHANGE UP
INR BLD: 1.08 RATIO — SIGNIFICANT CHANGE UP (ref 0.85–1.18)
KETONES UR-MCNC: NEGATIVE — SIGNIFICANT CHANGE UP
LACTATE SERPL-SCNC: 1.3 MMOL/L — SIGNIFICANT CHANGE UP (ref 0.7–2)
LEUKOCYTE ESTERASE UR-ACNC: NEGATIVE — SIGNIFICANT CHANGE UP
LG PLATELETS BLD QL AUTO: SLIGHT — SIGNIFICANT CHANGE UP
LYMPHOCYTES # BLD AUTO: 0.96 K/UL — LOW (ref 1–3.3)
LYMPHOCYTES # BLD AUTO: 19 % — SIGNIFICANT CHANGE UP (ref 13–44)
MANUAL SMEAR VERIFICATION: SIGNIFICANT CHANGE UP
MCHC RBC-ENTMCNC: 30.7 PG — SIGNIFICANT CHANGE UP (ref 27–34)
MCHC RBC-ENTMCNC: 31.4 G/DL — LOW (ref 32–36)
MCV RBC AUTO: 97.9 FL — SIGNIFICANT CHANGE UP (ref 80–100)
MONOCYTES # BLD AUTO: 0.91 K/UL — HIGH (ref 0–0.9)
MONOCYTES NFR BLD AUTO: 18 % — HIGH (ref 2–14)
NEUTROPHILS # BLD AUTO: 3.02 K/UL — SIGNIFICANT CHANGE UP (ref 1.8–7.4)
NEUTROPHILS NFR BLD AUTO: 57 % — SIGNIFICANT CHANGE UP (ref 43–77)
NEUTS BAND # BLD: 3 % — SIGNIFICANT CHANGE UP (ref 0–8)
NITRITE UR-MCNC: NEGATIVE — SIGNIFICANT CHANGE UP
NRBC # BLD: 0 /100 — SIGNIFICANT CHANGE UP (ref 0–0)
NRBC # BLD: SIGNIFICANT CHANGE UP /100 WBCS (ref 0–0)
PH UR: 6 — SIGNIFICANT CHANGE UP (ref 5–8)
PLAT MORPH BLD: ABNORMAL
PLATELET # BLD AUTO: 135 K/UL — LOW (ref 150–400)
POLYCHROMASIA BLD QL SMEAR: SLIGHT — SIGNIFICANT CHANGE UP
POTASSIUM SERPL-MCNC: 3.6 MMOL/L — SIGNIFICANT CHANGE UP (ref 3.5–5.3)
POTASSIUM SERPL-SCNC: 3.6 MMOL/L — SIGNIFICANT CHANGE UP (ref 3.5–5.3)
PROT SERPL-MCNC: 9.7 GM/DL — HIGH (ref 6–8.3)
PROT UR-MCNC: 30 MG/DL
PROTHROM AB SERPL-ACNC: 12.8 SEC — SIGNIFICANT CHANGE UP (ref 9.5–13)
RAPID RVP RESULT: SIGNIFICANT CHANGE UP
RBC # BLD: 2.41 M/UL — LOW (ref 4.2–5.8)
RBC # FLD: 17.2 % — HIGH (ref 10.3–14.5)
RBC BLD AUTO: ABNORMAL
RBC CASTS # UR COMP ASSIST: NEGATIVE /HPF — SIGNIFICANT CHANGE UP (ref 0–4)
SARS-COV-2 RNA SPEC QL NAA+PROBE: SIGNIFICANT CHANGE UP
SODIUM SERPL-SCNC: 134 MMOL/L — LOW (ref 135–145)
SP GR SPEC: 1.01 — SIGNIFICANT CHANGE UP (ref 1.01–1.02)
TROPONIN I, HIGH SENSITIVITY RESULT: 20.3 NG/L — SIGNIFICANT CHANGE UP
UROBILINOGEN FLD QL: NEGATIVE MG/DL — SIGNIFICANT CHANGE UP
WBC # BLD: 5.03 K/UL — SIGNIFICANT CHANGE UP (ref 3.8–10.5)
WBC # FLD AUTO: 5.03 K/UL — SIGNIFICANT CHANGE UP (ref 3.8–10.5)
WBC UR QL: SIGNIFICANT CHANGE UP

## 2023-09-18 PROCEDURE — 99285 EMERGENCY DEPT VISIT HI MDM: CPT

## 2023-09-18 PROCEDURE — 71045 X-RAY EXAM CHEST 1 VIEW: CPT | Mod: 26

## 2023-09-18 PROCEDURE — 99222 1ST HOSP IP/OBS MODERATE 55: CPT

## 2023-09-18 PROCEDURE — 74177 CT ABD & PELVIS W/CONTRAST: CPT | Mod: 26,MA

## 2023-09-18 PROCEDURE — 93010 ELECTROCARDIOGRAM REPORT: CPT

## 2023-09-18 PROCEDURE — 71275 CT ANGIOGRAPHY CHEST: CPT | Mod: 26,MC

## 2023-09-18 RX ORDER — ONDANSETRON 8 MG/1
4 TABLET, FILM COATED ORAL ONCE
Refills: 0 | Status: COMPLETED | OUTPATIENT
Start: 2023-09-18 | End: 2023-09-18

## 2023-09-18 RX ORDER — SODIUM CHLORIDE 9 MG/ML
2000 INJECTION INTRAMUSCULAR; INTRAVENOUS; SUBCUTANEOUS ONCE
Refills: 0 | Status: COMPLETED | OUTPATIENT
Start: 2023-09-18 | End: 2023-09-18

## 2023-09-18 RX ORDER — SODIUM CHLORIDE 9 MG/ML
1000 INJECTION, SOLUTION INTRAVENOUS
Refills: 0 | Status: COMPLETED | OUTPATIENT
Start: 2023-09-18 | End: 2023-09-18

## 2023-09-18 RX ORDER — LISINOPRIL 2.5 MG/1
10 TABLET ORAL DAILY
Refills: 0 | Status: DISCONTINUED | OUTPATIENT
Start: 2023-09-18 | End: 2023-09-20

## 2023-09-18 RX ORDER — CEFEPIME 1 G/1
2000 INJECTION, POWDER, FOR SOLUTION INTRAMUSCULAR; INTRAVENOUS EVERY 8 HOURS
Refills: 0 | Status: DISCONTINUED | OUTPATIENT
Start: 2023-09-18 | End: 2023-09-20

## 2023-09-18 RX ORDER — AMLODIPINE BESYLATE 2.5 MG/1
10 TABLET ORAL DAILY
Refills: 0 | Status: DISCONTINUED | OUTPATIENT
Start: 2023-09-18 | End: 2023-09-20

## 2023-09-18 RX ORDER — SODIUM CHLORIDE 9 MG/ML
1000 INJECTION INTRAMUSCULAR; INTRAVENOUS; SUBCUTANEOUS ONCE
Refills: 0 | Status: DISCONTINUED | OUTPATIENT
Start: 2023-09-18 | End: 2023-09-18

## 2023-09-18 RX ORDER — CEFEPIME 1 G/1
2000 INJECTION, POWDER, FOR SOLUTION INTRAMUSCULAR; INTRAVENOUS ONCE
Refills: 0 | Status: COMPLETED | OUTPATIENT
Start: 2023-09-18 | End: 2023-09-18

## 2023-09-18 RX ORDER — VANCOMYCIN HCL 1 G
1000 VIAL (EA) INTRAVENOUS ONCE
Refills: 0 | Status: COMPLETED | OUTPATIENT
Start: 2023-09-18 | End: 2023-09-18

## 2023-09-18 RX ADMIN — CEFEPIME 100 MILLIGRAM(S): 1 INJECTION, POWDER, FOR SOLUTION INTRAMUSCULAR; INTRAVENOUS at 17:33

## 2023-09-18 RX ADMIN — SODIUM CHLORIDE 100 MILLILITER(S): 9 INJECTION, SOLUTION INTRAVENOUS at 23:07

## 2023-09-18 RX ADMIN — CEFEPIME 2000 MILLIGRAM(S): 1 INJECTION, POWDER, FOR SOLUTION INTRAMUSCULAR; INTRAVENOUS at 20:00

## 2023-09-18 RX ADMIN — Medication 40 MILLIGRAM(S): at 23:06

## 2023-09-18 RX ADMIN — Medication 250 MILLIGRAM(S): at 20:05

## 2023-09-18 RX ADMIN — CEFEPIME 100 MILLIGRAM(S): 1 INJECTION, POWDER, FOR SOLUTION INTRAMUSCULAR; INTRAVENOUS at 22:20

## 2023-09-18 RX ADMIN — ONDANSETRON 4 MILLIGRAM(S): 8 TABLET, FILM COATED ORAL at 17:37

## 2023-09-18 RX ADMIN — SODIUM CHLORIDE 2000 MILLILITER(S): 9 INJECTION INTRAMUSCULAR; INTRAVENOUS; SUBCUTANEOUS at 17:33

## 2023-09-18 NOTE — H&P ADULT - ASSESSMENT
Patient is a 58M with a PMH of multiple myeloma (on immunotherapy), HTN, asthma, LINNEA, C Dif colitis who presents to the ED for dyspnea.  History limited as patient in mild to moderate respiratory distress.  Patient reports dyspnea since yesterday along with subjective fever at home.  SpO2 currently 85 on 4L via NC.  Tachycardic to 125 in ED, labs show elevated creatinine.  Will admit to tele.

## 2023-09-18 NOTE — ED PROVIDER NOTE - PROGRESS NOTE DETAILS
Dichter: Pt care signed out to me at change of shift, pending CT imaging. CT negative for PE, Cdiff, + RLL PNA. On re-eval, pt resting comfortably, in NAD. Spoke w/ pt oncologist Dr Alok Conde  (cell #): Reviewed labs / imaging / pt condition -  OK w/ admission @ [facility], will f/u w/ pt outpatient. Will admit to Tele (d/w Dr Geurrero). Pt updated to results, admission. He understands / agrees w/ this plan.

## 2023-09-18 NOTE — PATIENT PROFILE ADULT - FALL HARM RISK - HARM RISK INTERVENTIONS

## 2023-09-18 NOTE — ED ADULT NURSE NOTE - ED STAT RN HANDOFF DETAILS
Received pt from the change of shift. Pt is on continues cardiac monitor, on room air. Placed on nc as occasional O2 sat dropped to 80s.   1C-42D report given to nurse Shields. Pt is on nc 4L, O2 sat 94-95%. Nurse Shields notified that respiratory will set up the CPAP in 1C for pt's sleep apnea.

## 2023-09-18 NOTE — PATIENT PROFILE ADULT - DEAF OR HARD OF HEARING?
Mr. Soalno has been hospitalized a couple times.  Would you like to visit with him?  He is on chronic oxygen.  Thanks much.   no

## 2023-09-18 NOTE — H&P ADULT - NSHPPHYSICALEXAM_GEN_ALL_CORE
Physical exam:  General: patient in no acute distress, resting comfortably  Head:  Atraumatic, Normocephalic  Eyes: EOMI, PERRLA, clear sclera  Neck: Supple, thyroid nontender, non enlarged  Cardio: S1/S2 +ve, regular rate and rhythm, no M/G/R  Resp: decreased air entry bilaterally, mild wheezes to L side  GI: abdomen soft, nontender, non distended, no guarding, BS +ve x 4  Ext: no significant pedal edema  Neuro: CN 2-12 intact, no significant motor or sensory deficits.  Skin: No rashes or lesions

## 2023-09-18 NOTE — ED PROVIDER NOTE - CLINICAL SUMMARY MEDICAL DECISION MAKING FREE TEXT BOX
PMH and HPI as above  Patient with fever, noted to have (+) shock index  Will do sepsis workup  Will order EKG  Due to tachycardia and SOB and active cancer will get both CTA and CTAP  Will treat empirically with cefepime for now.   Will treat with IVF

## 2023-09-18 NOTE — ED PROVIDER NOTE - OBJECTIVE STATEMENT
59 yo M w/ PMHx of Ig Kappa MM (on chemo), HTN, Asthma, LINNEA, here with abdominal pain. Patient was admitted to [facility] in beginning of August due to BRENNAN and creatinine of 7 due to his multiple myeloma. Has a past hx of being diagnosed with c-diff colitis. Here with cough, some SOB, mild epigastric discomfort. Denies nausea, vomiting, chest pain. Last creatinine per patient was done one week ago and was normal.

## 2023-09-18 NOTE — H&P ADULT - PROBLEM SELECTOR PLAN 1
RLL PNA noted on CT  Started on cefepime and vancomycin  ID eval in am - please call  Patient noted to have hypoxia with SpO2 80-85 on NC while sleeping    Placed on BiPAP for sleep

## 2023-09-18 NOTE — H&P ADULT - NSHPLABSRESULTS_GEN_ALL_CORE
Recent Vitals  T(C): 36.5 (23 @ 22:00), Max: 38 (23 @ 13:21)  HR: 110 (23 @ 22:05) (102 - 128)  BP: 111/65 (23 @ 22:00) (93/59 - 120/71)  RR: 26 (23 @ 22:00) (18 - 26)  SpO2: 96% (23 @ 22:05) (90% - 98%)                        7.4    5.03  )-----------( 135      ( 18 Sep 2023 17:18 )             23.6         134<L>  |  106  |  24<H>  ----------------------------<  94  3.6   |  22  |  1.45<H>    Ca    9.8      18 Sep 2023 17:18    TPro  9.7<H>  /  Alb  2.7<L>  /  TBili  0.3  /  DBili  x   /  AST  47<H>  /  ALT  14  /  AlkPhos  36<L>      PT/INR - ( 18 Sep 2023 17:18 )   PT: 12.8 sec;   INR: 1.08 ratio         PTT - ( 18 Sep 2023 17:18 )  PTT:34.3 sec  LIVER FUNCTIONS - ( 18 Sep 2023 17:18 )  Alb: 2.7 g/dL / Pro: 9.7 gm/dL / ALK PHOS: 36 U/L / ALT: 14 U/L / AST: 47 U/L / GGT: x           Urinalysis Basic - ( 18 Sep 2023 21:50 )    Color: Yellow / Appearance: Clear / S.010 / pH: x  Gluc: x / Ketone: Negative  / Bili: Negative / Urobili: Negative mg/dL   Blood: x / Protein: 30 mg/dL / Nitrite: Negative   Leuk Esterase: Negative / RBC: Negative /HPF / WBC 0-2   Sq Epi: x / Non Sq Epi: x / Bacteria: x        Home Medications:  acetaminophen 325 mg oral tablet: 3 tab(s) orally every 6 hours, As needed, Temp greater or equal to 38C (100.4F), Mild Pain (1 - 3), Moderate Pain (4 - 6), Severe Pain (7 - 10) (2023 14:15)  Albuterol (Eqv-ProAir HFA) 90 mcg/inh inhalation aerosol: 2 puff(s) inhaled every 6 hours, As Needed (2023 14:15)  Bactrim  mg-160 mg oral tablet: 1 tab(s) orally 2 times a day only on MWF (2023 14:15)  mirtazapine 7.5 mg oral tablet: 1 tab(s) orally once a day (at bedtime), As Needed (2023 14:15)

## 2023-09-19 LAB
ABO RH CONFIRMATION: SIGNIFICANT CHANGE UP
ANION GAP SERPL CALC-SCNC: 2 MMOL/L — LOW (ref 5–17)
BLD GP AB SCN SERPL QL: SIGNIFICANT CHANGE UP
BUN SERPL-MCNC: 23 MG/DL — SIGNIFICANT CHANGE UP (ref 7–23)
CALCIUM SERPL-MCNC: 10.2 MG/DL — HIGH (ref 8.5–10.1)
CHLORIDE SERPL-SCNC: 111 MMOL/L — HIGH (ref 96–108)
CO2 SERPL-SCNC: 23 MMOL/L — SIGNIFICANT CHANGE UP (ref 22–31)
CREAT SERPL-MCNC: 1.14 MG/DL — SIGNIFICANT CHANGE UP (ref 0.5–1.3)
EGFR: 74 ML/MIN/1.73M2 — SIGNIFICANT CHANGE UP
GLUCOSE SERPL-MCNC: 133 MG/DL — HIGH (ref 70–99)
HCT VFR BLD CALC: 21.6 % — LOW (ref 39–50)
HCT VFR BLD CALC: 21.9 % — LOW (ref 39–50)
HCV AB S/CO SERPL IA: 0.04 S/CO — SIGNIFICANT CHANGE UP (ref 0–0.99)
HCV AB SERPL-IMP: SIGNIFICANT CHANGE UP
HGB BLD-MCNC: 6.8 G/DL — CRITICAL LOW (ref 13–17)
HGB BLD-MCNC: 6.9 G/DL — CRITICAL LOW (ref 13–17)
MCHC RBC-ENTMCNC: 30.9 PG — SIGNIFICANT CHANGE UP (ref 27–34)
MCHC RBC-ENTMCNC: 31.1 G/DL — LOW (ref 32–36)
MCHC RBC-ENTMCNC: 31.4 PG — SIGNIFICANT CHANGE UP (ref 27–34)
MCHC RBC-ENTMCNC: 31.9 G/DL — LOW (ref 32–36)
MCV RBC AUTO: 98.2 FL — SIGNIFICANT CHANGE UP (ref 80–100)
MCV RBC AUTO: 99.5 FL — SIGNIFICANT CHANGE UP (ref 80–100)
MRSA PCR RESULT.: SIGNIFICANT CHANGE UP
NRBC # BLD: 0 /100 WBCS — SIGNIFICANT CHANGE UP (ref 0–0)
NRBC # BLD: 0 /100 WBCS — SIGNIFICANT CHANGE UP (ref 0–0)
PLATELET # BLD AUTO: 110 K/UL — LOW (ref 150–400)
PLATELET # BLD AUTO: 126 K/UL — LOW (ref 150–400)
POTASSIUM SERPL-MCNC: 4.3 MMOL/L — SIGNIFICANT CHANGE UP (ref 3.5–5.3)
POTASSIUM SERPL-SCNC: 4.3 MMOL/L — SIGNIFICANT CHANGE UP (ref 3.5–5.3)
RBC # BLD: 2.2 M/UL — LOW (ref 4.2–5.8)
RBC # BLD: 2.2 M/UL — LOW (ref 4.2–5.8)
RBC # FLD: 17.2 % — HIGH (ref 10.3–14.5)
RBC # FLD: 17.6 % — HIGH (ref 10.3–14.5)
S AUREUS DNA NOSE QL NAA+PROBE: DETECTED
SODIUM SERPL-SCNC: 136 MMOL/L — SIGNIFICANT CHANGE UP (ref 135–145)
WBC # BLD: 5.77 K/UL — SIGNIFICANT CHANGE UP (ref 3.8–10.5)
WBC # BLD: 6.89 K/UL — SIGNIFICANT CHANGE UP (ref 3.8–10.5)
WBC # FLD AUTO: 5.77 K/UL — SIGNIFICANT CHANGE UP (ref 3.8–10.5)
WBC # FLD AUTO: 6.89 K/UL — SIGNIFICANT CHANGE UP (ref 3.8–10.5)

## 2023-09-19 PROCEDURE — 99222 1ST HOSP IP/OBS MODERATE 55: CPT

## 2023-09-19 PROCEDURE — 99233 SBSQ HOSP IP/OBS HIGH 50: CPT

## 2023-09-19 RX ORDER — AZITHROMYCIN 500 MG/1
500 TABLET, FILM COATED ORAL DAILY
Refills: 0 | Status: DISCONTINUED | OUTPATIENT
Start: 2023-09-20 | End: 2023-09-20

## 2023-09-19 RX ADMIN — CEFEPIME 100 MILLIGRAM(S): 1 INJECTION, POWDER, FOR SOLUTION INTRAMUSCULAR; INTRAVENOUS at 13:34

## 2023-09-19 RX ADMIN — LISINOPRIL 10 MILLIGRAM(S): 2.5 TABLET ORAL at 06:32

## 2023-09-19 RX ADMIN — CEFEPIME 100 MILLIGRAM(S): 1 INJECTION, POWDER, FOR SOLUTION INTRAMUSCULAR; INTRAVENOUS at 06:34

## 2023-09-19 RX ADMIN — CEFEPIME 100 MILLIGRAM(S): 1 INJECTION, POWDER, FOR SOLUTION INTRAMUSCULAR; INTRAVENOUS at 22:17

## 2023-09-19 RX ADMIN — AMLODIPINE BESYLATE 10 MILLIGRAM(S): 2.5 TABLET ORAL at 11:24

## 2023-09-19 NOTE — CONSULT NOTE ADULT - ASSESSMENT
A/P-  Patient is a 58M with a PMH of multiple myeloma (on immunotherapy), HTN, asthma, LINNEA, who is admitted for sob and cough that started a day prior to admission.  found to have RLL pneumonia.  afebrile  normal wbc  covid-19 - negative  blood cx- neg      plan-  can continue with cefepime for now in light of patient's MM and hence immune suppression., day #2.  check urine legionella ag.  add zithromax 500 mg daily for 3 days , can be given p.o.  monitor O2 sat.    All labs and imaging and chart notes reviewed.     All above discussed with patient and patient verbalizes full understanding of all above and agrees with above plan of care.    Thank you for this consultation.    Padmini Kirby MD  Infectious Disease Attending    for any questions please do not hesitate to contact me either via teams or by calling [phone]

## 2023-09-19 NOTE — CONSULT NOTE ADULT - SUBJECTIVE AND OBJECTIVE BOX
HPI:    Patient is a 58M with a PMH of multiple myeloma (on immunotherapy), HTN, asthma, LINNEA, C Dif colitis who presents to the ED for dyspnea.  History limited as patient in mild to moderate respiratory distress.  Patient reports dyspnea since yesterday along with subjective fever at home.  SpO2 currently 85 on 4L via NC.  Tachycardic to 125 in ED, labs show elevated creatinine.  Will admit to tele. (18 Sep 2023 22:46)      PAST MEDICAL & SURGICAL HISTORY:  Multiple myeloma      Hypertension      Hyperlipidemia      Asthma      S/P knee surgery          Allergies    No Known Allergies    Intolerances        ANTIMICROBIALS:  cefepime   IVPB 2000 every 8 hours      OTHER MEDS:  amLODIPine   Tablet 10 milliGRAM(s) Oral daily  lisinopril 10 milliGRAM(s) Oral daily      SOCIAL HISTORY:    Marital Status:    Occupation:   Lives with:     Substance Use (street drugs):   Tobacco Usage:    Alcohol Usage: Social EtOH    FAMILY HISTORY:  FH: HTN (hypertension)        ROS:  Unobtainable because:   All other systems negative     Constitutional: no fever, no chills, no weight loss, no night sweats  Eye: no eye pain, no redness, no vision changes  ENT:  no sore throat, no rhinorrhea  Cardiovascular:  no chest pain, no palpitation  Respiratory:  no SOB, no cough  GI:  no abd pain, no vomiting, no diarrhea  urinary: no dysuria, no hematuria, no flank pain  : no  discharge or bleeding  musculoskeletal:  no joint pain, no joint swelling  skin:  no rash  neurology:  no headache, no seizure, no change in mental status  psych: no anxiety, no depression     Physical Exam:    General:    NAD, non toxic  Head: atraumatic, normocephalic  Eyes: normal sclera and conjunctiva  ENT:   no oropharyngeal lesions, no LAD, neck supple  Cardio:    regular S1,S2, no murmur  Respiratory:   clear to auscultation b/l, no wheezing  abd:   soft, BS +, not tender, no hepatosplenomegaly  :     no CVAT, no suprapubic tenderness, no valiente  Musculoskeletal : no joint swelling, no edema  Skin:    no rash  vascular:  normal pulses  Neurologic:     no focal deficits  psych: normal affect, no suicidal ideation      Drug Dosing Weight  Height (cm): 185.4 (19 Sep 2023 03:21)  Weight (kg): 106.9 (19 Sep 2023 03:21)  BMI (kg/m2): 31.1 (19 Sep 2023 03:21)  BSA (m2): 2.31 (19 Sep 2023 03:21)    Vital Signs Last 24 Hrs  T(F): 98.7 (09-19-23 @ 11:08), Max: 100.4 (09-18-23 @ 13:21)    Vital Signs Last 24 Hrs  HR: 69 (09-19-23 @ 11:08) (69 - 128)  BP: 137/74 (09-19-23 @ 11:08) (93/59 - 137/74)  RR: 18 (09-19-23 @ 11:08)  SpO2: 96% (09-19-23 @ 11:08) (90% - 99%)  Wt(kg): --                          6.9    6.89  )-----------( 126      ( 19 Sep 2023 11:00 )             21.6       09-19    136  |  111<H>  |  23  ----------------------------<  133<H>  4.3   |  23  |  1.14    Ca    10.2<H>      19 Sep 2023 07:40    TPro  9.7<H>  /  Alb  2.7<L>  /  TBili  0.3  /  DBili  x   /  AST  47<H>  /  ALT  14  /  AlkPhos  36<L>  09-18      Urinalysis Basic - ( 19 Sep 2023 07:40 )    Color: x / Appearance: x / SG: x / pH: x  Gluc: 133 mg/dL / Ketone: x  / Bili: x / Urobili: x   Blood: x / Protein: x / Nitrite: x   Leuk Esterase: x / RBC: x / WBC x   Sq Epi: x / Non Sq Epi: x / Bacteria: x        MICROBIOLOGY:  v      Rapid RVP Result: NotDetec (09-18 @ 17:18)          RADIOLOGY:       HPI:    Patient is a 58M with a PMH of multiple myeloma (on immunotherapy), HTN, asthma, LINNEA, who is admitted for sob and cough that started a day prior to admission.  Patient states his symptoms started very suddenly, denies any chest pain, denies any nausea , denies any diarrhea, denies any bad. pain.  denies any sick contacts, denies any recent travel.denies any chest pain, denies any dysurea, denies any HA.  he was noted to be hypoxemic on admission but his O2 sat has improved on current supplemental O2.  he has inspire for his LINNEA .  he was reported negative for Covid-19.  he states he was told he would get PRBC transfusion for the current anemia.  he states compared to yesterday he feels much better and denies any sob now, states he has minimal dry cough.  states has good appetite.  wishes to go home soon.      PAST MEDICAL & SURGICAL HISTORY:  Multiple myeloma  Hypertension  Hyperlipidemia  Asthma      S/P knee surgery          Allergies    No Known drug allergies      ANTIMICROBIALS:  cefepime   IVPB 2000 every 8 hours      OTHER MEDS:  amLODIPine   Tablet 10 milliGRAM(s) Oral daily  lisinopril 10 milliGRAM(s) Oral daily      SOCIAL HISTORY:    Substance Use (street drugs): denies  Tobacco Usage:  denies  Alcohol Usage: Social EtOH    FAMILY HISTORY:  FH: HTN (hypertension)        ROS:    All other systems negative     Constitutional: no fever, no chills, no weight loss, no night sweats  Eye: no eye pain, no redness, no vision changes  ENT:  no sore throat, no rhinorrhea  Cardiovascular:  no chest pain, no palpitation  Respiratory:  +SOB, +cough, sob has resoolved  GI:  no abd pain, no vomiting, no diarrhea  urinary: no dysuria, no hematuria, no flank pain  : no  discharge or bleeding  musculoskeletal:  no joint pain, no joint swelling  skin:  no rash  neurology:  no headache, no seizure, no change in mental status  psych: no anxiety, no depression     Physical Exam:    General:    NAD, non toxic  Head: atraumatic, normocephalic  Eyes: normal sclera and conjunctiva  ENT:   no oropharyngeal lesions, no LAD, neck supple  Cardio:    regular S1,S2, no murmur  Respiratory:   right base crackles heard, no wheezing  abd:   soft, BS +, not tender, no hepatosplenomegaly  :     no CVAT, no suprapubic tenderness, no valiente  Musculoskeletal : no joint swelling, no edema  Skin:    no rash  vascular:  normal pulses  Neurologic:     no focal deficits, AAO x 3  psych: normal affect, no suicidal ideation      Drug Dosing Weight  Height (cm): 185.4 (19 Sep 2023 03:21)  Weight (kg): 106.9 (19 Sep 2023 03:21)  BMI (kg/m2): 31.1 (19 Sep 2023 03:21)  BSA (m2): 2.31 (19 Sep 2023 03:21)    Vital Signs Last 24 Hrs  T(F): 98.7 (09-19-23 @ 11:08), Max: 100.4 (09-18-23 @ 13:21)    Vital Signs Last 24 Hrs  HR: 69 (09-19-23 @ 11:08) (69 - 128)  BP: 137/74 (09-19-23 @ 11:08) (93/59 - 137/74)  RR: 18 (09-19-23 @ 11:08)  SpO2: 96% (09-19-23 @ 11:08) (90% - 99%)  Wt(kg): --                          6.9    6.89  )-----------( 126      ( 19 Sep 2023 11:00 )             21.6       09-19    136  |  111<H>  |  23  ----------------------------<  133<H>  4.3   |  23  |  1.14    Ca    10.2<H>      19 Sep 2023 07:40    TPro  9.7<H>  /  Alb  2.7<L>  /  TBili  0.3  /  DBili  x   /  AST  47<H>  /  ALT  14  /  AlkPhos  36<L>  09-18      Urinalysis Basic - ( 19 Sep 2023 07:40 )    Color: x / Appearance: x / SG: x / pH: x  Gluc: 133 mg/dL / Ketone: x  / Bili: x / Urobili: x   Blood: x / Protein: x / Nitrite: x   Leuk Esterase: x / RBC: x / WBC x   Sq Epi: x / Non Sq Epi: x / Bacteria: x        MICROBIOLOGY:  Culture - Blood (12.14.22 @ 17:15)    Specimen Source: .Blood Blood-Peripheral   Culture Results:   No Growth Final  Culture - Blood (12.14.22 @ 16:30)    Specimen Source: .Blood Blood-Peripheral   Culture Results:   No Growth Final  Culture - Urine (12.14.22 @ 17:31)    Specimen Source: Clean Catch Clean Catch (Midstream)   Culture Results:   No growth      Rapid RVP Result: NotDetec (09-18 @ 17:18)    RADIOLOGY:  < from: Xray Chest 1 View- PORTABLE-Urgent (09.18.23 @ 17:21) >  ACC: [id number] EXAM:  XR CHEST PORTABLE URGENT 1V   ORDERED BY: LIO CHU     PROCEDURE DATE:  09/18/2023          INTERPRETATION:  TIME OF EXAM: September 18, 2023 at 4:57 PM.    CLINICAL INFORMATION: Sepsis.    COMPARISON:  CT scan of the chest from February 23, 2023.    TECHNIQUE:   AP Portable chest x-ray.    INTERPRETATION:    Heart size and the mediastinum cannot be accurately evaluated on this   projection.  There is a right-sided neurostimulator with the generator obscuring part   of the right lung.  There is right lower lung airspace opacity, concerning for pneumonia.  The left lung is clear.  No pleural effusion or pneumothorax is seen.  No acute bony abnormality is noted.        IMPRESSION:  Right lower lung airspace opacity concerning for pneumonia.   Recommend short-term follow-up to evaluate for resolution and to exclude   other possible etiologies.    --- End of Report ---      < end of copied text >

## 2023-09-20 ENCOUNTER — TRANSCRIPTION ENCOUNTER (OUTPATIENT)
Age: 58
End: 2023-09-20

## 2023-09-20 VITALS — OXYGEN SATURATION: 96 % | HEART RATE: 69 BPM

## 2023-09-20 LAB
ANION GAP SERPL CALC-SCNC: 3 MMOL/L — LOW (ref 5–17)
BUN SERPL-MCNC: 26 MG/DL — HIGH (ref 7–23)
CALCIUM SERPL-MCNC: 10 MG/DL — SIGNIFICANT CHANGE UP (ref 8.5–10.1)
CHLORIDE SERPL-SCNC: 109 MMOL/L — HIGH (ref 96–108)
CO2 SERPL-SCNC: 25 MMOL/L — SIGNIFICANT CHANGE UP (ref 22–31)
CREAT SERPL-MCNC: 0.99 MG/DL — SIGNIFICANT CHANGE UP (ref 0.5–1.3)
CULTURE RESULTS: SIGNIFICANT CHANGE UP
EGFR: 87 ML/MIN/1.73M2 — SIGNIFICANT CHANGE UP
GLUCOSE SERPL-MCNC: 90 MG/DL — SIGNIFICANT CHANGE UP (ref 70–99)
HCT VFR BLD CALC: 24.2 % — LOW (ref 39–50)
HGB BLD-MCNC: 7.7 G/DL — LOW (ref 13–17)
LEGIONELLA AG UR QL: NEGATIVE — SIGNIFICANT CHANGE UP
MAGNESIUM SERPL-MCNC: 2.1 MG/DL — SIGNIFICANT CHANGE UP (ref 1.6–2.6)
MCHC RBC-ENTMCNC: 30.8 PG — SIGNIFICANT CHANGE UP (ref 27–34)
MCHC RBC-ENTMCNC: 31.8 G/DL — LOW (ref 32–36)
MCV RBC AUTO: 96.8 FL — SIGNIFICANT CHANGE UP (ref 80–100)
NRBC # BLD: 0 /100 WBCS — SIGNIFICANT CHANGE UP (ref 0–0)
PHOSPHATE SERPL-MCNC: 3 MG/DL — SIGNIFICANT CHANGE UP (ref 2.5–4.5)
PLATELET # BLD AUTO: 150 K/UL — SIGNIFICANT CHANGE UP (ref 150–400)
POTASSIUM SERPL-MCNC: 3.9 MMOL/L — SIGNIFICANT CHANGE UP (ref 3.5–5.3)
POTASSIUM SERPL-SCNC: 3.9 MMOL/L — SIGNIFICANT CHANGE UP (ref 3.5–5.3)
RBC # BLD: 2.5 M/UL — LOW (ref 4.2–5.8)
RBC # FLD: 18.2 % — HIGH (ref 10.3–14.5)
SODIUM SERPL-SCNC: 137 MMOL/L — SIGNIFICANT CHANGE UP (ref 135–145)
SPECIMEN SOURCE: SIGNIFICANT CHANGE UP
WBC # BLD: 7.82 K/UL — SIGNIFICANT CHANGE UP (ref 3.8–10.5)
WBC # FLD AUTO: 7.82 K/UL — SIGNIFICANT CHANGE UP (ref 3.8–10.5)

## 2023-09-20 PROCEDURE — 99233 SBSQ HOSP IP/OBS HIGH 50: CPT

## 2023-09-20 PROCEDURE — 99239 HOSP IP/OBS DSCHRG MGMT >30: CPT

## 2023-09-20 RX ORDER — ACETAMINOPHEN 500 MG
650 TABLET ORAL EVERY 6 HOURS
Refills: 0 | Status: DISCONTINUED | OUTPATIENT
Start: 2023-09-20 | End: 2023-09-20

## 2023-09-20 RX ORDER — LISINOPRIL 2.5 MG/1
1 TABLET ORAL
Qty: 0 | Refills: 0 | DISCHARGE
Start: 2023-09-20

## 2023-09-20 RX ADMIN — AMLODIPINE BESYLATE 10 MILLIGRAM(S): 2.5 TABLET ORAL at 05:23

## 2023-09-20 RX ADMIN — AZITHROMYCIN 500 MILLIGRAM(S): 500 TABLET, FILM COATED ORAL at 11:20

## 2023-09-20 RX ADMIN — CEFEPIME 100 MILLIGRAM(S): 1 INJECTION, POWDER, FOR SOLUTION INTRAMUSCULAR; INTRAVENOUS at 13:15

## 2023-09-20 RX ADMIN — LISINOPRIL 10 MILLIGRAM(S): 2.5 TABLET ORAL at 05:23

## 2023-09-20 RX ADMIN — Medication 100 MILLIGRAM(S): at 13:19

## 2023-09-20 RX ADMIN — CEFEPIME 100 MILLIGRAM(S): 1 INJECTION, POWDER, FOR SOLUTION INTRAMUSCULAR; INTRAVENOUS at 05:24

## 2023-09-20 RX ADMIN — Medication 650 MILLIGRAM(S): at 05:23

## 2023-09-20 NOTE — PROGRESS NOTE ADULT - PROBLEM SELECTOR PLAN 4
on weekly immunotherapy; follows with [facility] - Dr. Alok Jose   Now with anemia   - transfuse for hemoglobin < 7   - Ordered for 1 unit prbc on 9/19
on weekly immunotherapy; follows with [facility] - Dr. Alok Jose   Now with anemia   - transfuse for hemoglobin < 7   - Ordered for 1 unit prbc on 9/19

## 2023-09-20 NOTE — PROGRESS NOTE ADULT - PROBLEM SELECTOR PLAN 1
RLL PNA noted on CT  Started on cefepime and vancomycin in ED, will continue hCAP coverage - cefepime for now given MM   [ ] MRSA swab, sputum culture, strep urine, and legionella   - f/u blood cultures   ID eval, awaiting rec   Patient noted to have hypoxia with SpO2 80-85 on NC while sleeping    Placed on BiPAP for sleep
RLL PNA noted on CT  Started on cefepime and vancomycin in ED, will continue hCAP coverage - cefepime for now given MM , will likely transition to PO on 9/21   [ ] MRSA swab, sputum culture, strep urine, and legionella   - f/u blood cultures   ID eval, appreciate rec   Patient noted to have hypoxia with SpO2 80-85 on NC while sleeping -    Placed on BiPAP for sleep

## 2023-09-20 NOTE — DISCHARGE NOTE NURSING/CASE MANAGEMENT/SOCIAL WORK - PATIENT PORTAL LINK FT
You can access the FollowMyHealth Patient Portal offered by [facility] by registering at the following website: [email or web address]. By joining [facility]’s FollowMyHealth portal, you will also be able to view your health information using other applications (apps) compatible with our system.

## 2023-09-20 NOTE — DISCHARGE NOTE PROVIDER - NSDCCPCAREPLAN_GEN_ALL_CORE_FT
PRINCIPAL DISCHARGE DIAGNOSIS  Diagnosis: Right lower lobe pneumonia  Assessment and Plan of Treatment: You were admitted with pneumonia. Please continue antibitiocs      SECONDARY DISCHARGE DIAGNOSES  Diagnosis: Multiple myeloma  Assessment and Plan of Treatment: You presented with anemia and received one unit of blood. With improvement in counts

## 2023-09-20 NOTE — CHART NOTE - NSCHARTNOTESELECT_GEN_ALL_CORE
06/09/2022  Justice Eaton Jr. is a 45 y.o., male.      Pre-op Assessment    I have reviewed the Patient Summary Reports.     I have reviewed the Nursing Notes. I have reviewed the NPO Status.   I have reviewed the Medications.     Review of Systems  Anesthesia Hx:  No previous Anesthesia  Denies Family Hx of Anesthesia complications.   Denies Personal Hx of Anesthesia complications.   Social:  Smoker, Social Alcohol Use    Hematology/Oncology:  Hematology Normal   Oncology Normal     EENT/Dental:EENT/Dental Normal   Cardiovascular:  Cardiovascular Normal     Pulmonary:  Pulmonary Normal    Renal/:  Renal/ Normal     Hepatic/GI:  Hepatic/GI Normal    Musculoskeletal:   Arthritis     Neurological:  Neurology Normal    Endocrine:  Endocrine Normal  Obesity / BMI > 30  Dermatological:  Skin Normal    Psych:  Psychiatric Normal           Physical Exam  General: Cooperative, Alert and Oriented    Airway:  Mallampati: I   Mouth Opening: Normal  Neck ROM: Normal ROM    Dental:  Dentures        Anesthesia Plan  Type of Anesthesia, risks & benefits discussed:    Anesthesia Type: Gen ETT  Intra-op Monitoring Plan: Standard ASA Monitors  Post Op Pain Control Plan: multimodal analgesia  Induction:  IV  Airway Plan: Video, Post-Induction  Informed Consent: Informed consent signed with the Patient and all parties understand the risks and agree with anesthesia plan.  All questions answered.   ASA Score: 2  Anesthesia Plan Notes: Labs in epic ok    Ready For Surgery From Anesthesia Perspective.     .       Nutrition Services

## 2023-09-20 NOTE — DISCHARGE NOTE PROVIDER - NSDCMRMEDTOKEN_GEN_ALL_CORE_FT
acetaminophen 325 mg oral tablet: 3 tab(s) orally every 6 hours, As needed, Temp greater or equal to 38C (100.4F), Mild Pain (1 - 3), Moderate Pain (4 - 6), Severe Pain (7 - 10)  acyclovir 400 mg oral tablet: 1 tab(s) orally once a day  Albuterol (Eqv-ProAir HFA) 90 mcg/inh inhalation aerosol: 2 puff(s) inhaled every 6 hours, As Needed  amLODIPine 10 mg oral tablet: 1 tab(s) orally once a day  Bactrim  mg-160 mg oral tablet: 1 tab(s) orally 2 times a day only on MWF  mirtazapine 7.5 mg oral tablet: 1 tab(s) orally once a day (at bedtime), As Needed  vancomycin 50 mg/mL oral liquid: 2.5 milliliter(s) orally every 6 hours    acetaminophen 325 mg oral tablet: 3 tab(s) orally every 6 hours, As needed, Temp greater or equal to 38C (100.4F), Mild Pain (1 - 3), Moderate Pain (4 - 6), Severe Pain (7 - 10)  acyclovir 400 mg oral tablet: 1 tab(s) orally once a day  Albuterol (Eqv-ProAir HFA) 90 mcg/inh inhalation aerosol: 2 puff(s) inhaled every 6 hours, As Needed  amLODIPine 10 mg oral tablet: 1 tab(s) orally once a day  amoxicillin-clavulanate 875 mg-125 mg oral tablet: 1 tab(s) orally 2 times a day  Bactrim  mg-160 mg oral tablet: 1 tab(s) orally 2 times a day only on MWF  lisinopril 10 mg oral tablet: 1 tab(s) orally once a day  mirtazapine 7.5 mg oral tablet: 1 tab(s) orally once a day (at bedtime), As Needed

## 2023-09-20 NOTE — PROGRESS NOTE ADULT - PROBLEM SELECTOR PLAN 2
Cr 1.45 from 0.9 pre-renal given improvement with iv hydration  trend labs
Cr 1.45 from 0.9 pre-renal given improvement with iv hydration  trend labs

## 2023-09-20 NOTE — DISCHARGE NOTE PROVIDER - HOSPITAL COURSE
58M with a PMH of multiple myeloma (on immunotherapy), HTN, asthma, LINNEA, C Dif colitis who presents to the ED for dyspnea.  History limited as patient in mild to moderate respiratory distress.  Patient reports dyspnea since yesterday along with subjective fever at home.  SpO2 currently 85 on 4L via NC.  Tachycardic to 125 in ED, labs show elevated creatinine.  Will admit to tele.       Problem/Plan - 1:  ·  Problem: Right lower lobe pneumonia.   ·  Plan: RLL PNA noted on CT  Started on cefepime and vancomycin in ED, will continue hCAP coverage - cefepime for now given MM , will likely transition to PO on 9/21   [ ] MRSA swab, sputum culture, strep urine, and legionella   - f/u blood cultures   ID eval, appreciate rec   Patient noted to have hypoxia with SpO2 80-85 on NC while sleeping -    Placed on BiPAP for sleep.    58M with a PMH of multiple myeloma (on immunotherapy), HTN, asthma, LINNEA, C Dif colitis who presents to the ED for dyspnea.  History limited as patient in mild to moderate respiratory distress.  Patient reports dyspnea since yesterday along with subjective fever at home.  SpO2 currently 85 on 4L via NC.  Tachycardic to 125 in ED, labs show elevated creatinine.  Will admit to tele.       Problem/Plan - 1:  ·  Problem: Right lower lobe pneumonia.   ·  Plan: RLL PNA noted on CT  Started on cefepime and vancomycin in ED, will continue hCAP coverage - cefepime for now given MM , will likely transition to PO on 9/21   [ ] MRSA swab, sputum culture, strep urine, and legionella   - f/u blood cultures   ID eval, appreciate rec   Patient noted to have hypoxia with SpO2 80-85 on NC while sleeping -    Placed on BiPAP for sleep.  - to complete augmentin

## 2023-09-20 NOTE — PROGRESS NOTE ADULT - ASSESSMENT
Patient is a 58M with a PMH of multiple myeloma (on immunotherapy), HTN, asthma, LINNEA, C Dif colitis who presents to the ED for dyspnea.  History limited as patient in mild to moderate respiratory distress.  Patient reports dyspnea since yesterday along with subjective fever at home.  SpO2 currently 85 on 4L via NC.  Tachycardic to 125 in ED, labs show elevated creatinine.  Will admit to tele.
A/P-  Patient is a 58M with a PMH of multiple myeloma (on immunotherapy), HTN, asthma, LINNEA, who is admitted for sob and cough that started a day prior to admission.  found to have RLL pneumonia.    saturates well on RA   afebrile  normal wbc  covid-19 - negative  blood cx- neg      plan-  can continue with cefepime for now in light of patient's MM and hence immune suppression., day #3  continue with  zithromax 500 mg daily for 3 days , can be given p.o. day #2  monitor O2 sat.  if patient sat well ambulating advise to d/c home tomm on po abx for 3 more days.  d/w Hospitalist .  All labs and imaging and chart notes reviewed.     All above discussed with patient and patient verbalizes full understanding of all above and agrees with above plan of care.      Padmini Kirby MD  Infectious Disease Attending  
  Patient is a 58M with a PMH of multiple myeloma (on immunotherapy), HTN, asthma, LINNEA, C Dif colitis who presents to the ED for dyspnea.  History limited as patient in mild to moderate respiratory distress.  Patient reports dyspnea since yesterday along with subjective fever at home.  SpO2 currently 85 on 4L via NC.  Tachycardic to 125 in ED, labs show elevated creatinine.  Will admit to tele.

## 2023-09-20 NOTE — DISCHARGE NOTE PROVIDER - NSDCADMDATE_GEN_ALL_CORE_FT
Internal Medicine Medical Student Note  Note Author: Lenin Lemus, Student    Name Efrain Martines 1950   Age/Sex 69 y.o. male   MRN [id number]   Code Status FULL       Reason for interval visit  (Principal Problem)   CVA (cerebral vascular accident)     Interval Problem Daily Status Update  (problem status, last 24 hours, new history, new data )   70 yo male with ESRD on PD s/p left nephrectomy was found down at home following PD therapy. Brought in to the ED altered with extreme expressive aphasia.   - Over night RNs noted nausea; on exam the patient states he had trouble with throat clearance due to cortrak not specifically nauseous   - SLP re-evaluated the patient and noted a continued cough with thin liquids; O2 drops with mastication; dysphagia 2/NTL  - Cortrak removed this am; patient is better able to enunciate and communicate after removal  - Overnight, he refused finger sticks.   - His blood glucose remains in the 200s  - BP overnight was noted at 178/126 reduced to 142/76 with hydralazine  - Accepted to inpatient rehab; pending neuro consult   - PD fluid culture pending; preliminary read shows no growth at 48 hours    Review of Systems   Constitutional: Negative for chills, diaphoresis and fever.   HENT: Negative for congestion, hearing loss, sore throat and tinnitus.    Eyes: Negative for blurred vision, double vision and photophobia.   Respiratory: Positive for cough (With fluids; patient states chronic since prior CVA). Negative for hemoptysis and sputum production.    Cardiovascular: Negative for chest pain, palpitations, orthopnea and leg swelling.   Gastrointestinal: Negative for abdominal pain, constipation, diarrhea, heartburn, nausea and vomiting.   Genitourinary: Positive for frequency. Negative for dysuria, hematuria and urgency.   Musculoskeletal: Positive for back pain. Negative for joint pain, myalgias and neck pain.   Skin: Negative for itching and rash.    Neurological: Negative for dizziness, tremors, sensory change and headaches.   Endo/Heme/Allergies: Negative for polydipsia.   Psychiatric/Behavioral: The patient is nervous/anxious.        Physical Exam       Vitals:    04/15/19 2300 04/15/19 2316 04/16/19 0350 04/16/19 0753   BP: 143/76  144/77 (!) 180/104   Pulse: 100 (!) 104 (!) 101 (!) 105   Resp: 16  16 16   Temp: 36.3 °C (97.3 °F)  36.5 °C (97.7 °F) 36.7 °C (98.1 °F)   TempSrc: Temporal  Temporal Temporal   SpO2: 91% 91% 91% 95%   Weight:       Height:         Body mass index is 29.33 kg/m².    Oxygen Therapy:  Pulse Oximetry: 95 %, O2 (LPM): 0, O2 Delivery: None (Room Air)    Physical Exam   Constitutional: He is oriented to person, place, and time and well-developed, well-nourished, and in no distress.   HENT:   Head: Normocephalic and atraumatic.   Mouth/Throat: No oropharyngeal exudate.   Eyes: Pupils are equal, round, and reactive to light. Conjunctivae and EOM are normal. No scleral icterus.   Neck: Normal range of motion. Neck supple. No thyromegaly present.   Cardiovascular: An irregular rhythm present. Tachycardia present.  Exam reveals gallop and S3.    Pulses:       Radial pulses are 1+ on the right side, and 1+ on the left side.   Pulmonary/Chest: Effort normal. No respiratory distress. He has decreased breath sounds in the right lower field and the left lower field. He has no wheezes.   Abdominal: Soft. Bowel sounds are hyperactive. There is tenderness (Mid-epigastric).   PD tube in place   Musculoskeletal: Normal range of motion. He exhibits no edema.   Neurological: He is alert and oriented to person, place, and time. No cranial nerve deficit.   Mild expressive aphasia; improved  No strength or sensation deficits  No facial droop noted on today's exam   Skin: Skin is warm and dry.   Psychiatric: Affect normal.     Recent Labs      04/13/19   1545  04/14/19   0321  04/15/19   0336   WBC   --   9.1  8.5   RBC   --   4.08*  4.19*   HEMOGLOBIN    --   12.6*  12.8*   HEMATOCRIT   --   38.0*  40.5*   MCV   --   93.1  96.7   MCH   --   30.9  30.5   RDW   --   46.2  48.2   PLATELETCT   --   199  167   MPV   --   11.6  11.4   NEUTSPOLYS   --   74.30*  70.00   LYMPHOCYTES   --   14.10*  15.40*   MONOCYTES   --   8.40  9.80   EOSINOPHILS  7  2.70  3.70   BASOPHILS   --   0.30  0.60     Recent Labs      04/14/19   0321  04/15/19   0336  04/16/19   0253   SODIUM  140  139  139   POTASSIUM  5.1  4.6  4.2   CHLORIDE  106  104  100   CO2  21  21  24   GLUCOSE  114*  239*  295*   BUN  61*  63*  73*     Recent Labs      04/14/19   0321  04/15/19   0336  04/16/19   0253   ALBUMIN  3.4  3.5  3.7   TBILIRUBIN  0.4  0.3  0.4   ALKPHOSPHAT  85  95  107*   TOTPROTEIN  7.0  6.9  7.4   ALTSGPT  9  <5  <5   ASTSGOT  8*  10*  10*   CREATININE  8.23*  8.56*  8.84*       Assessment/Plan   # Cerebral Vascular Accident  The patient presented with sudden onset expressive aphasia. Initially unable to express more than yes/no; now is able to form complete sentences, with some word finding and word use issues. The patient was noted to have a mild facial droop, but not seen on today's exam. He was found to be supra-theraputic on coumadin; INR 4.8 on admission; today INR 1.47. CT head showed no sign of hemorrhagic stroke; the patient is unable to have an MRI due to cerebral coiling and incompatible pacemaker. The patient continues to improve; was able to have his cortrak removed this am, although continues to have cough with even thin liquids.   Plan:  - Accepted to inpatient rehab; pending neuro consult  - Pt unable to have MRI, as noted above, neuro requesting repeat CT head without  - No longer allowing for permissive hypertension; hydralazine 10mg prn, or SBP>160  - PD fluid culture pending; preliminary read shows no growth at 48 hours  - Continue Atorvastatin and insulin therapy  - Continue on home dose of warfarin 2.5 mg Tue/Wed and 5 mg all other days   - Follow up INR 72 hours  post-discharge.   - Allowing GI soft diet; recommending 1:1 feeds    #ESRD  By hx; pts BUN/Cr = 63/8.56; GFR=6 on admit. The patient has an elevated troponin due to ESRD  - Continue PD QHS  - PD fluid culture pending   - Nephrology to follow    #Diabetes Mellitus   A1C = 9.3; insulin dependent; home lantus 35 units QHS  - Switch from insulin sliding scale to lantus 20 units QHS; titrate up as needed; goal blood glucose 120-150   18-Sep-2023 20:20

## 2023-09-20 NOTE — DISCHARGE NOTE PROVIDER - NSDCCAREPROVSEEN_GEN_ALL_CORE_FT
Fabienne Bansal Odomzo Counseling- I discussed with the patient the risks of Odomzo including but not limited to nausea, vomiting, diarrhea, constipation, weight loss, changes in the sense of taste, decreased appetite, muscle spasms, and hair loss.  The patient verbalized understanding of the proper use and possible adverse effects of Odomzo.  All of the patient's questions and concerns were addressed.

## 2023-09-20 NOTE — PROGRESS NOTE ADULT - TIME BILLING
Review of laboratory data, radiology results, consultants' recommendations, documentation in [place], discussion with patient/house staff/ACP and interdisciplinary staff (such as , social workers, etc). Interventions were performed as documented above.
Review of laboratory data, radiology results, consultants' recommendations, documentation in [place], discussion with patient/house staff/ACP and interdisciplinary staff (such as , social workers, etc). Interventions were performed as documented above.

## 2023-09-20 NOTE — PROGRESS NOTE ADULT - SUBJECTIVE AND OBJECTIVE BOX
[facility]  INFECTIOUS DISEASES   [address] [place] [address]  Tel: [phone]     Fax: [phone]  ==============================================================================  MD Dmitriy Matamoros, DO Juana Cha, NP   ==============================================================================      ALEAH REDMAN  MRN-[id number]  58y ([birth year])      Interval History:  patient sitting up in chair.  denies any sob  denies any cough  denies any chills    ROS:    [ ] Unobtainable because:  [ ] All other systems negative except as noted    Constitutional: no fever, no chills  Head: no trauma  Eyes: no vision changes, no eye pain  ENT:  no sore throat, no rhinorrhea  Cardiovascular:  no chest pain, no palpitation  Respiratory:  no SOB, no cough  GI:  no abd pain, no vomiting, no diarrhea  urinary: no dysuria, no hematuria, no flank pain  musculoskeletal:  no joint pain, no joint swelling  skin:  no rash  neurology:  no headache, no seizure, no change in mental status  psych: no anxiety, no depression         Allergies  No Known Allergies        ANTIMICROBIALS:  azithromycin   Tablet 500 daily  cefepime   IVPB 2000 every 8 hours        Physical Exam:  Vital Signs Last 24 Hrs  T(C): 37 (20 Sep 2023 11:02), Max: 37.2 (19 Sep 2023 19:54)  T(F): 98.6 (20 Sep 2023 11:02), Max: 98.9 (19 Sep 2023 19:54)  HR: 63 (20 Sep 2023 11:02) (47 - 88)  BP: 107/75 (20 Sep 2023 11:02) (107/75 - 136/85)  BP(mean): --  RR: 18 (20 Sep 2023 11:02) (18 - 19)  SpO2: 95% (20 Sep 2023 11:02) (93% - 100%)    Parameters below as of 20 Sep 2023 11:02  Patient On (Oxygen Delivery Method): room air        09-19-23 @ 07:01  -  09-20-23 @ 07:00  --------------------------------------------------------  IN: 0 mL / OUT: 950 mL / NET: -950 mL      General:    NAD,  non toxic  Head: atraumatic, normocephalic  Eye: normal sclera and conjunctiva  ENT:    no oral lesions, neck supple  Cardio:     regular S1, S2,  no murmur  Respiratory:    better breath sounds b/l ,no wheezing  abd:     soft,   BS +,   no tenderness  Musculoskeletal:   no joint swelling,   no edema  Neurologic:     no focal deficit      WBC Count: 7.82 K/uL (09-20 @ 07:15)  WBC Count: 6.89 K/uL (09-19 @ 11:00)  WBC Count: 5.77 K/uL (09-19 @ 07:40)  WBC Count: 5.03 K/uL (09-18 @ 17:18)                            7.7    7.82  )-----------( 150      ( 20 Sep 2023 07:15 )             24.2       09-20    137  |  109<H>  |  26<H>  ----------------------------<  90  3.9   |  25  |  0.99    Ca    10.0      20 Sep 2023 07:15  Phos  3.0     09-20  Mg     2.1     09-20    TPro  9.7<H>  /  Alb  2.7<L>  /  TBili  0.3  /  DBili  x   /  AST  47<H>  /  ALT  14  /  AlkPhos  36<L>  09-18      Urinalysis Basic - ( 20 Sep 2023 07:15 )    Color: x / Appearance: x / SG: x / pH: x  Gluc: 90 mg/dL / Ketone: x  / Bili: x / Urobili: x   Blood: x / Protein: x / Nitrite: x   Leuk Esterase: x / RBC: x / WBC x   Sq Epi: x / Non Sq Epi: x / Bacteria: x          Creatinine Trend: 0.99<--, 1.14<--, 1.45<--  Lactate, Blood: 1.3 mmol/L (09-18-23 @ 17:18)      MICROBIOLOGY:  v  Clean Catch Clean Catch (Midstream)  09-18-23   <10,000 CFU/mL Normal Urogenital La  --  --      .Blood Blood-Peripheral  09-18-23   No growth at 24 hours  --  --      .Blood Blood-Peripheral  09-18-23   No growth at 24 hours  --  --  Rapid RVP Result: NotDetec (09-18 @ 17:18)    SARS-CoV-2: NotDetec (09-18-23 @ 17:18)  Rapid RVP Result: NotDetec (09-18-23 @ 17:18)          
[facility]  Division of Hospital Medicine  Fabienne Bansal MD  Pager: [phone]      Patient is a 58y old  Male who presents with a chief complaint of PNA (18 Sep 2023 22:46)      SUBJECTIVE / OVERNIGHT EVENTS: Patient examined at bedside. Reports feeling a lot better. No signs of acute blood loss. Follows at [facility] with Dr. Alok Jose. Open to blood transfusion had one in december   ADDITIONAL REVIEW OF SYSTEMS:    MEDICATIONS  (STANDING):  amLODIPine   Tablet 10 milliGRAM(s) Oral daily  cefepime   IVPB 2000 milliGRAM(s) IV Intermittent every 8 hours  lisinopril 10 milliGRAM(s) Oral daily    MEDICATIONS  (PRN):      CAPILLARY BLOOD GLUCOSE        I&O's Summary      PHYSICAL EXAM:  Vital Signs Last 24 Hrs  T(C): 37.1 (19 Sep 2023 11:08), Max: 38 (18 Sep 2023 13:21)  T(F): 98.7 (19 Sep 2023 11:08), Max: 100.4 (18 Sep 2023 13:21)  HR: 69 (19 Sep 2023 11:08) (69 - 128)  BP: 137/74 (19 Sep 2023 11:08) (93/59 - 137/74)  BP(mean): --  RR: 18 (19 Sep 2023 11:08) (18 - 26)  SpO2: 96% (19 Sep 2023 11:08) (90% - 99%)    Parameters below as of 19 Sep 2023 11:08  Patient On (Oxygen Delivery Method): room air      General: Middle age man in no acute distress, resting comfortably  Head:  Atraumatic, Normocephalic  Eyes: EOMI, PERRLA, clear sclera  Neck: Supple, thyroid nontender, non enlarged  Cardio: S1/S2 +ve, regular rate and rhythm, no M/G/R  Resp: decreased air entry bilaterally, mild wheezes to L side  GI: abdomen soft, nontender, non distended, no guarding, BS +ve x 4  Ext: no significant pedal edema  Neuro: CN 2-12 intact, no significant motor or sensory deficits.  Skin: No rashes or lesions    LABS:                        6.9    6.89  )-----------( 126      ( 19 Sep 2023 11:00 )             21.6     09-19    136  |  111<H>  |  23  ----------------------------<  133<H>  4.3   |  23  |  1.14    Ca    10.2<H>      19 Sep 2023 07:40    TPro  9.7<H>  /  Alb  2.7<L>  /  TBili  0.3  /  DBili  x   /  AST  47<H>  /  ALT  14  /  AlkPhos  36<L>  09-18    PT/INR - ( 18 Sep 2023 17:18 )   PT: 12.8 sec;   INR: 1.08 ratio         PTT - ( 18 Sep 2023 17:18 )  PTT:34.3 sec      Urinalysis Basic - ( 19 Sep 2023 07:40 )    Color: x / Appearance: x / SG: x / pH: x  Gluc: 133 mg/dL / Ketone: x  / Bili: x / Urobili: x   Blood: x / Protein: x / Nitrite: x   Leuk Esterase: x / RBC: x / WBC x   Sq Epi: x / Non Sq Epi: x / Bacteria: x          RADIOLOGY & ADDITIONAL TESTS:  Results Reviewed:   Imaging Personally Reviewed:  Electrocardiogram Personally Reviewed:    COORDINATION OF CARE:  Care Discussed with Consultants/Other Providers [Y/N]:  Prior or Outpatient Records Reviewed [Y/N]:  
[facility]  Division of Hospital Medicine  Fabienne Bansal MD  Pager: [phone]      Patient is a 58y old  Male who presents with a chief complaint of PNA (19 Sep 2023 13:14)      SUBJECTIVE / OVERNIGHT EVENTS: Patient reports cough. Is feeling better. Hemoglobin stable.   ADDITIONAL REVIEW OF SYSTEMS:    MEDICATIONS  (STANDING):  amLODIPine   Tablet 10 milliGRAM(s) Oral daily  azithromycin   Tablet 500 milliGRAM(s) Oral daily  benzonatate 100 milliGRAM(s) Oral three times a day  cefepime   IVPB 2000 milliGRAM(s) IV Intermittent every 8 hours  lisinopril 10 milliGRAM(s) Oral daily    MEDICATIONS  (PRN):  acetaminophen     Tablet .. 650 milliGRAM(s) Oral every 6 hours PRN Mild Pain (1 - 3)  guaiFENesin Oral Liquid (Sugar-Free) 100 milliGRAM(s) Oral every 6 hours PRN Cough      CAPILLARY BLOOD GLUCOSE        I&O's Summary    19 Sep 2023 07:01  -  20 Sep 2023 07:00  --------------------------------------------------------  IN: 0 mL / OUT: 950 mL / NET: -950 mL        PHYSICAL EXAM:  Vital Signs Last 24 Hrs  T(C): 37 (20 Sep 2023 11:02), Max: 37.2 (19 Sep 2023 19:54)  T(F): 98.6 (20 Sep 2023 11:02), Max: 98.9 (19 Sep 2023 19:54)  HR: 63 (20 Sep 2023 11:02) (47 - 88)  BP: 107/75 (20 Sep 2023 11:02) (107/75 - 136/85)  BP(mean): --  RR: 18 (20 Sep 2023 11:02) (18 - 19)  SpO2: 95% (20 Sep 2023 11:02) (93% - 100%)    Parameters below as of 20 Sep 2023 11:02  Patient On (Oxygen Delivery Method): room air  General: Middle age man in no acute distress, resting comfortably  Head:  Atraumatic, Normocephalic  Eyes: EOMI, PERRLA, clear sclera  Neck: Supple, thyroid nontender, non enlarged  Cardio: S1/S2 +ve, regular rate and rhythm, no M/G/R  Resp: decreased air entry bilaterally, mild wheezes to L side  GI: abdomen soft, nontender, non distended, no guarding, BS +ve x 4  Ext: no significant pedal edema  Neuro: CN 2-12 intact, no significant motor or sensory deficits.  Skin: No rashes or lesions      LABS:                        7.7    7.82  )-----------( 150      ( 20 Sep 2023 07:15 )             24.2     09-20    137  |  109<H>  |  26<H>  ----------------------------<  90  3.9   |  25  |  0.99    Ca    10.0      20 Sep 2023 07:15  Phos  3.0     09-20  Mg     2.1     09-20    TPro  9.7<H>  /  Alb  2.7<L>  /  TBili  0.3  /  DBili  x   /  AST  47<H>  /  ALT  14  /  AlkPhos  36<L>  09-18    PT/INR - ( 18 Sep 2023 17:18 )   PT: 12.8 sec;   INR: 1.08 ratio         PTT - ( 18 Sep 2023 17:18 )  PTT:34.3 sec      Urinalysis Basic - ( 20 Sep 2023 07:15 )    Color: x / Appearance: x / SG: x / pH: x  Gluc: 90 mg/dL / Ketone: x  / Bili: x / Urobili: x   Blood: x / Protein: x / Nitrite: x   Leuk Esterase: x / RBC: x / WBC x   Sq Epi: x / Non Sq Epi: x / Bacteria: x        Culture - Urine (collected 18 Sep 2023 21:50)  Source: Clean Catch Clean Catch (Midstream)  Final Report (20 Sep 2023 06:55):    <10,000 CFU/mL Normal Urogenital La    Culture - Blood (collected 18 Sep 2023 17:18)  Source: .Blood Blood-Peripheral  Preliminary Report (20 Sep 2023 02:02):    No growth at 24 hours    Culture - Blood (collected 18 Sep 2023 17:08)  Source: .Blood Blood-Peripheral  Preliminary Report (20 Sep 2023 02:02):    No growth at 24 hours        RADIOLOGY & ADDITIONAL TESTS:  Results Reviewed:   Imaging Personally Reviewed:  Electrocardiogram Personally Reviewed:    COORDINATION OF CARE:  Care Discussed with Consultants/Other Providers [Y/N]:  Prior or Outpatient Records Reviewed [Y/N]:

## 2023-09-20 NOTE — CHART NOTE - NSCHARTNOTEFT_GEN_A_CORE
Patient seen for consult for MST >2 (wt loss, poor PO intake).  Patient has been screened for and presents negative for:    -Pressure ulcers stage 2 or greater  -Enteral or Parenteral Nutrition  -BMI <18  -[id number] >8  -Chewing/Swallowing Difficulty  -Decreased appetite  -Unintentional Weight Loss  -Inadequate diet (i.e. NPO/Clear Liquids)    Pt admitted c SOB due to PNA  Pt reports no significant wt loss; PO intake a few days prior to admission  Pt reports he is eating well   No physical signs of malnutrition observed    No nutritional intervention required at this time. RD remains available.

## 2023-09-21 LAB — S PNEUM AG UR QL: NEGATIVE — SIGNIFICANT CHANGE UP

## 2023-09-25 DIAGNOSIS — G47.33 OBSTRUCTIVE SLEEP APNEA (ADULT) (PEDIATRIC): ICD-10-CM

## 2023-09-25 DIAGNOSIS — C90.00 MULTIPLE MYELOMA NOT HAVING ACHIEVED REMISSION: ICD-10-CM

## 2023-09-25 DIAGNOSIS — R09.02 HYPOXEMIA: ICD-10-CM

## 2023-09-25 DIAGNOSIS — N17.9 ACUTE KIDNEY FAILURE, UNSPECIFIED: ICD-10-CM

## 2023-09-25 DIAGNOSIS — I10 ESSENTIAL (PRIMARY) HYPERTENSION: ICD-10-CM

## 2023-09-25 DIAGNOSIS — Z20.822 CONTACT WITH AND (SUSPECTED) EXPOSURE TO COVID-19: ICD-10-CM

## 2023-09-25 DIAGNOSIS — E78.5 HYPERLIPIDEMIA, UNSPECIFIED: ICD-10-CM

## 2023-09-25 DIAGNOSIS — J18.9 PNEUMONIA, UNSPECIFIED ORGANISM: ICD-10-CM

## 2023-09-25 DIAGNOSIS — J45.909 UNSPECIFIED ASTHMA, UNCOMPLICATED: ICD-10-CM

## 2023-09-25 DIAGNOSIS — Z79.899 OTHER LONG TERM (CURRENT) DRUG THERAPY: ICD-10-CM

## 2023-09-25 DIAGNOSIS — D63.0 ANEMIA IN NEOPLASTIC DISEASE: ICD-10-CM

## 2024-05-07 NOTE — H&P ADULT - PROBLEM/PLAN-4
DISPLAY PLAN FREE TEXT Plan: - apply betamethasone 0.05% lotion to scalp QD x 2 weeks\\n- f/u in 2 weeks Detail Level: Zone Plan: - recommended pt takes ashwaganda root supplements QD\\n- f/u in 3 months

## 2024-08-23 NOTE — PATIENT PROFILE ADULT - FUNCTIONAL ASSESSMENT - BASIC MOBILITY 6.
[FreeTextEntry3] : Large joint injection was performed of the bilateral knee.  The indication for this procedure was pain, inflammation and x-ray evidence of Osteoarthritis on this or prior visit. The site was prepped with alcohol, betadine, ethyl chloride sprayed topically and sterile technique used.  An injection of EUFLEXXA 2ml, series #3 was used.   Patient was advised to call if redness, pain or fever occur, apply ice for 15 minutes out of every hour for the next 12-24 hours as tolerated and patient was advised to rest the joint(s) for 2 days. Patient has tried OTC's including aspirin, Ibuprofen, Aleve, etc or prescription NSAIDS, and/or exercises at home and/or physical therapy without satisfactory response, patient had decreased mobility in the joint and the risks benefits, and alternatives have been discussed, and verbal consent was obtained.  3-calculated by average/Not able to assess (calculate score using [facility] averaging method)